# Patient Record
Sex: MALE | Race: WHITE | NOT HISPANIC OR LATINO | Employment: FULL TIME | URBAN - METROPOLITAN AREA
[De-identification: names, ages, dates, MRNs, and addresses within clinical notes are randomized per-mention and may not be internally consistent; named-entity substitution may affect disease eponyms.]

---

## 2023-03-09 ENCOUNTER — OFFICE VISIT (OUTPATIENT)
Dept: PRIMARY CARE | Facility: CLINIC | Age: 43
End: 2023-03-09
Payer: COMMERCIAL

## 2023-03-09 VITALS
DIASTOLIC BLOOD PRESSURE: 126 MMHG | BODY MASS INDEX: 28.14 KG/M2 | HEIGHT: 69 IN | WEIGHT: 190 LBS | SYSTOLIC BLOOD PRESSURE: 179 MMHG

## 2023-03-09 DIAGNOSIS — Z91.89 HIGH RISK FOR COLON CANCER: ICD-10-CM

## 2023-03-09 DIAGNOSIS — F41.1 ANXIETY, GENERALIZED: Primary | ICD-10-CM

## 2023-03-09 DIAGNOSIS — K21.9 GASTROESOPHAGEAL REFLUX DISEASE WITHOUT ESOPHAGITIS: ICD-10-CM

## 2023-03-09 DIAGNOSIS — G47.33 OBSTRUCTIVE SLEEP APNEA: ICD-10-CM

## 2023-03-09 DIAGNOSIS — I10 PRIMARY HYPERTENSION: ICD-10-CM

## 2023-03-09 DIAGNOSIS — Z30.09 ENCOUNTER FOR VASECTOMY COUNSELING: ICD-10-CM

## 2023-03-09 DIAGNOSIS — M54.2 NECK PAIN: ICD-10-CM

## 2023-03-09 DIAGNOSIS — Z00.00 HEALTHCARE MAINTENANCE: ICD-10-CM

## 2023-03-09 DIAGNOSIS — F33.41 RECURRENT MAJOR DEPRESSIVE DISORDER, IN PARTIAL REMISSION (CMS-HCC): ICD-10-CM

## 2023-03-09 DIAGNOSIS — E50.9 VITAMIN A DEFICIENCY, UNSPECIFIED: ICD-10-CM

## 2023-03-09 DIAGNOSIS — L72.0 INCLUSION CYST OF SKIN OF SHOULDER: ICD-10-CM

## 2023-03-09 LAB
ERYTHROCYTE DISTRIBUTION WIDTH (RATIO) BY AUTOMATED COUNT: 13.5 % (ref 11.5–14.5)
ERYTHROCYTE MEAN CORPUSCULAR HEMOGLOBIN CONCENTRATION (G/DL) BY AUTOMATED: 33.3 G/DL (ref 32–36)
ERYTHROCYTE MEAN CORPUSCULAR VOLUME (FL) BY AUTOMATED COUNT: 92 FL (ref 80–100)
ERYTHROCYTES (10*6/UL) IN BLOOD BY AUTOMATED COUNT: 4.96 X10E12/L (ref 4.5–5.9)
HEMATOCRIT (%) IN BLOOD BY AUTOMATED COUNT: 45.6 % (ref 41–52)
HEMOGLOBIN (G/DL) IN BLOOD: 15.2 G/DL (ref 13.5–17.5)
LEUKOCYTES (10*3/UL) IN BLOOD BY AUTOMATED COUNT: 7 X10E9/L (ref 4.4–11.3)
NRBC (PER 100 WBCS) BY AUTOMATED COUNT: 0 /100 WBC (ref 0–0)
PLATELETS (10*3/UL) IN BLOOD AUTOMATED COUNT: 365 X10E9/L (ref 150–450)

## 2023-03-09 PROCEDURE — 85027 COMPLETE CBC AUTOMATED: CPT

## 2023-03-09 PROCEDURE — 84443 ASSAY THYROID STIM HORMONE: CPT

## 2023-03-09 PROCEDURE — 99386 PREV VISIT NEW AGE 40-64: CPT | Performed by: FAMILY MEDICINE

## 2023-03-09 PROCEDURE — 3080F DIAST BP >= 90 MM HG: CPT | Performed by: FAMILY MEDICINE

## 2023-03-09 PROCEDURE — 4004F PT TOBACCO SCREEN RCVD TLK: CPT | Performed by: FAMILY MEDICINE

## 2023-03-09 PROCEDURE — 36415 COLL VENOUS BLD VENIPUNCTURE: CPT | Performed by: FAMILY MEDICINE

## 2023-03-09 PROCEDURE — 82607 VITAMIN B-12: CPT

## 2023-03-09 PROCEDURE — 80061 LIPID PANEL: CPT

## 2023-03-09 PROCEDURE — 82306 VITAMIN D 25 HYDROXY: CPT

## 2023-03-09 PROCEDURE — 3077F SYST BP >= 140 MM HG: CPT | Performed by: FAMILY MEDICINE

## 2023-03-09 PROCEDURE — 80053 COMPREHEN METABOLIC PANEL: CPT

## 2023-03-09 RX ORDER — ESCITALOPRAM OXALATE 10 MG/1
10 TABLET ORAL DAILY
COMMUNITY
Start: 2022-10-20 | End: 2023-03-12 | Stop reason: SDUPTHER

## 2023-03-09 RX ORDER — SULFAMETHOXAZOLE AND TRIMETHOPRIM 800; 160 MG/1; MG/1
1 TABLET ORAL 2 TIMES DAILY
Qty: 14 TABLET | Refills: 0 | Status: SHIPPED | OUTPATIENT
Start: 2023-03-09 | End: 2023-03-16

## 2023-03-09 RX ORDER — ACETAMINOPHEN 500 MG
TABLET ORAL
Qty: 1 EACH | Refills: 0 | Status: SHIPPED | OUTPATIENT
Start: 2023-03-09

## 2023-03-09 RX ORDER — AMLODIPINE AND BENAZEPRIL HYDROCHLORIDE 5; 10 MG/1; MG/1
1 CAPSULE ORAL DAILY
Qty: 30 CAPSULE | Refills: 11 | Status: SHIPPED | OUTPATIENT
Start: 2023-03-09 | End: 2023-04-10 | Stop reason: ALTCHOICE

## 2023-03-09 RX ORDER — PANTOPRAZOLE SODIUM 40 MG/1
40 TABLET, DELAYED RELEASE ORAL DAILY
Qty: 30 TABLET | Refills: 1 | Status: SHIPPED | OUTPATIENT
Start: 2023-03-09 | End: 2023-03-31

## 2023-03-09 RX ORDER — BUSPIRONE HYDROCHLORIDE 5 MG/1
5 TABLET ORAL 2 TIMES DAILY
COMMUNITY
Start: 2022-10-20 | End: 2023-03-12 | Stop reason: SDUPTHER

## 2023-03-09 ASSESSMENT — PAIN SCALES - GENERAL: PAINLEVEL: 0-NO PAIN

## 2023-03-10 ENCOUNTER — TELEPHONE (OUTPATIENT)
Dept: PRIMARY CARE | Facility: CLINIC | Age: 43
End: 2023-03-10
Payer: COMMERCIAL

## 2023-03-10 LAB
ALANINE AMINOTRANSFERASE (SGPT) (U/L) IN SER/PLAS: 13 U/L (ref 10–52)
ALBUMIN (G/DL) IN SER/PLAS: 4.6 G/DL (ref 3.4–5)
ALKALINE PHOSPHATASE (U/L) IN SER/PLAS: 61 U/L (ref 33–120)
ANION GAP IN SER/PLAS: 14 MMOL/L (ref 10–20)
ASPARTATE AMINOTRANSFERASE (SGOT) (U/L) IN SER/PLAS: 14 U/L (ref 9–39)
BILIRUBIN TOTAL (MG/DL) IN SER/PLAS: 0.5 MG/DL (ref 0–1.2)
CALCIDIOL (25 OH VITAMIN D3) (NG/ML) IN SER/PLAS: 24 NG/ML
CALCIUM (MG/DL) IN SER/PLAS: 9.7 MG/DL (ref 8.6–10.6)
CARBON DIOXIDE, TOTAL (MMOL/L) IN SER/PLAS: 24 MMOL/L (ref 21–32)
CHLORIDE (MMOL/L) IN SER/PLAS: 106 MMOL/L (ref 98–107)
CHOLESTEROL (MG/DL) IN SER/PLAS: 207 MG/DL (ref 0–199)
CHOLESTEROL IN HDL (MG/DL) IN SER/PLAS: 53 MG/DL
CHOLESTEROL/HDL RATIO: 3.9
COBALAMIN (VITAMIN B12) (PG/ML) IN SER/PLAS: 344 PG/ML (ref 211–911)
CREATININE (MG/DL) IN SER/PLAS: 0.89 MG/DL (ref 0.5–1.3)
GFR MALE: >90 ML/MIN/1.73M2
GLUCOSE (MG/DL) IN SER/PLAS: 88 MG/DL (ref 74–99)
LDL: 130 MG/DL (ref 0–99)
POTASSIUM (MMOL/L) IN SER/PLAS: 3.9 MMOL/L (ref 3.5–5.3)
PROTEIN TOTAL: 6.8 G/DL (ref 6.4–8.2)
SODIUM (MMOL/L) IN SER/PLAS: 140 MMOL/L (ref 136–145)
THYROTROPIN (MIU/L) IN SER/PLAS BY DETECTION LIMIT <= 0.05 MIU/L: 1.13 MIU/L (ref 0.44–3.98)
TRIGLYCERIDE (MG/DL) IN SER/PLAS: 120 MG/DL (ref 0–149)
UREA NITROGEN (MG/DL) IN SER/PLAS: 14 MG/DL (ref 6–23)
VLDL: 24 MG/DL (ref 0–40)

## 2023-03-12 RX ORDER — BUSPIRONE HYDROCHLORIDE 5 MG/1
5 TABLET ORAL 2 TIMES DAILY
Qty: 60 TABLET | Refills: 2 | Status: SHIPPED | OUTPATIENT
Start: 2023-03-12 | End: 2023-04-05

## 2023-03-12 RX ORDER — ESCITALOPRAM OXALATE 10 MG/1
10 TABLET ORAL DAILY
Qty: 30 TABLET | Refills: 2 | Status: SHIPPED | OUTPATIENT
Start: 2023-03-12 | End: 2023-04-05

## 2023-03-13 NOTE — PROGRESS NOTES
"Reason for Visit: Annual Physical Exam    HPI:Patient here to [facility].   Snores very loudly   Has been depressed wOULD LIKE TO TAKE MEDS REGULARLY  Bp EVERY HIGH  NECK PAIN SEEVRE IN am  INFECTED CYST painful       Active Problem List  Patient Active Problem List   Diagnosis    Recurrent major depressive disorder, in partial remission (CMS/HCC)    Anxiety, generalized    Obstructive sleep apnea    Neck pain    Primary hypertension    High risk for colon cancer    Vitamin a deficiency, unspecified    Healthcare maintenance       Comprehensive Medical/Surgical/Social/Family History  Past Medical History:   Diagnosis Date    Anxiety     Depression     Hypertension     Sleep apnea      Past Surgical History:   Procedure Laterality Date    CYST REMOVAL       Social History     Social History Narrative    Not on file         Allergies and Medications  Patient has no known allergies.  Current Outpatient Medications on File Prior to Visit   Medication Sig Dispense Refill    busPIRone (Buspar) 5 mg tablet Take 1 tablet (5 mg) by mouth in the morning and 1 tablet (5 mg) before bedtime.      escitalopram (Lexapro) 10 mg tablet Take 1 tablet (10 mg) by mouth once daily.       No current facility-administered medications on file prior to visit.         ROS otherwise negative aside from what was mentioned above in HPI.    Vitals  BP (!) 179/126   Ht 1.753 m (5' 9\")   Wt 86.2 kg (190 lb)   BMI 28.06 kg/m²   Body mass index is 28.06 kg/m².  Physical Exam  Gen: Alert, NAD  HEENT:  PERRLA, EOMI, conjunctiva and sclera normal in appearance. External auditory canals/TMs normal; Oral cavity and posterior pharynx without lesions/exudate  Neck:  Supple with FROM; No masses/nodes palpable; Thyroid nontender and without nodules; No JUDITH  Respiratory:  Lungs CTAB  Cardiovascular:  Heart RRR. No M/R/G. Peripheral pulses equal bilaterally  Abdomen:  Soft, nontender, BS present throughout; No R/G/R; No HSM or masses palpated  Extremities:  " FROM all extremities; Muscle strength grossly normal with good tone  Neuro:  CN II-XII intact; Reflexes 2+/2+; Gross motor and sensory intact  Skin:  No suspicious lesions present  Breast: No masses, skin lesions or nipple discharges, no axillary lymphadenopathy    Assessment and Plan:  Problem List Items Addressed This Visit          Nervous    Obstructive sleep apnea    Relevant Orders    Home sleep test    Neck pain    Relevant Orders    XR cervical spine 2-3 views       Circulatory    Primary hypertension    Relevant Medications    blood pressure test kit-large kit    amLODIPine-benazepriL (Lotrel) 5-10 mg capsule    Other Relevant Orders    CT cardiac scoring wo IV contrast    Referral to General Surgery       Endocrine/Metabolic    Vitamin a deficiency, unspecified    Relevant Orders    Vitamin D, Total (Completed)    Vitamin B12 (Completed)       Other    Recurrent major depressive disorder, in partial remission (CMS/HCC)    Anxiety, generalized - Primary    High risk for colon cancer    Relevant Orders    Colonoscopy    Healthcare maintenance    Relevant Orders    Comprehensive Metabolic Panel (Completed)    CBC (Completed)    Lipid Panel (Completed)    Thyroid Stimulating Hormone (Completed)     Other Visit Diagnoses       Encounter for vasectomy counseling        Relevant Orders    Referral to Urology    Gastroesophageal reflux disease without esophagitis        Relevant Medications    pantoprazole (ProtoNix) 40 mg EC tablet    Inclusion cyst of skin of shoulder        Relevant Medications    sulfamethoxazole-trimethoprim (Bactrim DS) 800-160 mg tablet

## 2023-03-16 ENCOUNTER — APPOINTMENT (OUTPATIENT)
Dept: PRIMARY CARE | Facility: CLINIC | Age: 43
End: 2023-03-16
Payer: COMMERCIAL

## 2023-03-31 DIAGNOSIS — K21.9 GASTROESOPHAGEAL REFLUX DISEASE WITHOUT ESOPHAGITIS: ICD-10-CM

## 2023-03-31 RX ORDER — PANTOPRAZOLE SODIUM 40 MG/1
40 TABLET, DELAYED RELEASE ORAL DAILY
Qty: 90 TABLET | Refills: 1 | Status: SHIPPED | OUTPATIENT
Start: 2023-03-31 | End: 2023-09-18 | Stop reason: SDUPTHER

## 2023-04-05 DIAGNOSIS — F41.1 ANXIETY, GENERALIZED: ICD-10-CM

## 2023-04-05 RX ORDER — ESCITALOPRAM OXALATE 10 MG/1
10 TABLET ORAL DAILY
Qty: 90 TABLET | Refills: 1 | Status: SHIPPED | OUTPATIENT
Start: 2023-04-05 | End: 2023-04-10 | Stop reason: SDUPTHER

## 2023-04-05 RX ORDER — BUSPIRONE HYDROCHLORIDE 5 MG/1
5 TABLET ORAL 2 TIMES DAILY
Qty: 60 TABLET | Refills: 2 | Status: SHIPPED | OUTPATIENT
Start: 2023-04-05 | End: 2023-04-10 | Stop reason: SDUPTHER

## 2023-04-10 ENCOUNTER — OFFICE VISIT (OUTPATIENT)
Dept: PRIMARY CARE | Facility: CLINIC | Age: 43
End: 2023-04-10
Payer: COMMERCIAL

## 2023-04-10 VITALS
HEART RATE: 91 BPM | WEIGHT: 188 LBS | BODY MASS INDEX: 27.85 KG/M2 | HEIGHT: 69 IN | DIASTOLIC BLOOD PRESSURE: 100 MMHG | SYSTOLIC BLOOD PRESSURE: 138 MMHG

## 2023-04-10 DIAGNOSIS — M54.2 NECK PAIN: ICD-10-CM

## 2023-04-10 DIAGNOSIS — I10 PRIMARY HYPERTENSION: Primary | ICD-10-CM

## 2023-04-10 DIAGNOSIS — Z91.89 HIGH RISK FOR COLON CANCER: ICD-10-CM

## 2023-04-10 DIAGNOSIS — G47.33 OBSTRUCTIVE SLEEP APNEA: ICD-10-CM

## 2023-04-10 DIAGNOSIS — F41.1 ANXIETY, GENERALIZED: ICD-10-CM

## 2023-04-10 DIAGNOSIS — F33.41 RECURRENT MAJOR DEPRESSIVE DISORDER, IN PARTIAL REMISSION (CMS-HCC): ICD-10-CM

## 2023-04-10 PROBLEM — F33.9 RECURRENT MAJOR DEPRESSIVE EPISODES (CMS-HCC): Status: RESOLVED | Noted: 2023-04-10 | Resolved: 2023-04-10

## 2023-04-10 PROBLEM — F17.200 TOBACCO DEPENDENCE SYNDROME: Status: RESOLVED | Noted: 2023-04-10 | Resolved: 2023-04-10

## 2023-04-10 PROBLEM — G44.019 EPISODIC CLUSTER HEADACHE: Status: RESOLVED | Noted: 2023-04-10 | Resolved: 2023-04-10

## 2023-04-10 PROBLEM — B35.3 TINEA PEDIS OF BOTH FEET: Status: RESOLVED | Noted: 2019-06-17 | Resolved: 2023-04-10

## 2023-04-10 PROCEDURE — 3075F SYST BP GE 130 - 139MM HG: CPT | Performed by: FAMILY MEDICINE

## 2023-04-10 PROCEDURE — 99214 OFFICE O/P EST MOD 30 MIN: CPT | Performed by: FAMILY MEDICINE

## 2023-04-10 PROCEDURE — 4004F PT TOBACCO SCREEN RCVD TLK: CPT | Performed by: FAMILY MEDICINE

## 2023-04-10 PROCEDURE — 3080F DIAST BP >= 90 MM HG: CPT | Performed by: FAMILY MEDICINE

## 2023-04-10 RX ORDER — AMLODIPINE AND BENAZEPRIL HYDROCHLORIDE 5; 20 MG/1; MG/1
1 CAPSULE ORAL DAILY
Qty: 90 CAPSULE | Refills: 1 | Status: SHIPPED | OUTPATIENT
Start: 2023-04-10 | End: 2023-06-12 | Stop reason: ALTCHOICE

## 2023-04-10 RX ORDER — ESCITALOPRAM OXALATE 10 MG/1
10 TABLET ORAL DAILY
Qty: 90 TABLET | Refills: 0 | Status: SHIPPED | OUTPATIENT
Start: 2023-04-10 | End: 2023-06-12 | Stop reason: SDUPTHER

## 2023-04-10 RX ORDER — BUSPIRONE HYDROCHLORIDE 5 MG/1
5 TABLET ORAL 2 TIMES DAILY
Qty: 180 TABLET | Refills: 0 | Status: SHIPPED | OUTPATIENT
Start: 2023-04-10 | End: 2024-01-18

## 2023-04-15 ASSESSMENT — ENCOUNTER SYMPTOMS
FEVER: 0
GASTROINTESTINAL NEGATIVE: 1
NEUROLOGICAL NEGATIVE: 1
ENDOCRINE NEGATIVE: 1
HEMATOLOGIC/LYMPHATIC NEGATIVE: 1
PSYCHIATRIC NEGATIVE: 1
NAUSEA: 0
VOMITING: 0
MUSCULOSKELETAL NEGATIVE: 1
ALLERGIC/IMMUNOLOGIC NEGATIVE: 1
CHILLS: 0
CARDIOVASCULAR NEGATIVE: 1
RESPIRATORY NEGATIVE: 1

## 2023-04-15 NOTE — PROGRESS NOTES
"Subjective   Patient ID: Francisco J Walsh is a 42 y.o. male who presents for Follow-up (Sleep studdy colon shoulder all scheduled ).      HPI patient presents for follow-up blood pressure remains high waiting to get colonoscopy done neck pain has improved since blood pressure has improved anxiety depression responding to current treatment he is scheduled for vasectomy  Current Outpatient Medications on File Prior to Visit   Medication Sig Dispense Refill    blood pressure test kit-large kit Check BP daily 1 each 0    pantoprazole (ProtoNix) 40 mg EC tablet Take 1 tablet (40 mg) by mouth once daily. 90 tablet 1    [DISCONTINUED] amLODIPine-benazepriL (Lotrel) 5-10 mg capsule Take 1 capsule by mouth once daily. 30 capsule 11    [DISCONTINUED] busPIRone (Buspar) 5 mg tablet Take 1 tablet (5 mg) by mouth in the morning and 1 tablet (5 mg) before bedtime. 60 tablet 2    [DISCONTINUED] escitalopram (Lexapro) 10 mg tablet Take 1 tablet (10 mg) by mouth once daily. 90 tablet 1     No current facility-administered medications on file prior to visit.        Review of Systems   Constitutional:  Negative for chills and fever.   HENT: Negative.     Respiratory: Negative.     Cardiovascular: Negative.    Gastrointestinal: Negative.  Negative for nausea and vomiting.   Endocrine: Negative.    Genitourinary: Negative.    Musculoskeletal: Negative.    Skin: Negative.  Negative for rash.   Allergic/Immunologic: Negative.    Neurological: Negative.    Hematological: Negative.    Psychiatric/Behavioral: Negative.     All other systems reviewed and are negative.      Objective   Blood Pressure (Abnormal) 138/100   Pulse 91   Height 1.753 m (5' 9\")   Weight 85.3 kg (188 lb)   Body Mass Index 27.76 kg/m²   BSA: 2.04 meters squared  Growth percentiles: Facility age limit for growth %chandu is 20 years. Facility age limit for growth %chandu is 20 years.   No visits with results within 1 Week(s) from this visit.   Latest known visit with " results is:   Office Visit on 03/09/2023   Component Date Value Ref Range Status    Glucose 03/09/2023 88  74 - 99 mg/dL Final    Sodium 03/09/2023 140  136 - 145 mmol/L Final    Potassium 03/09/2023 3.9  3.5 - 5.3 mmol/L Final    Chloride 03/09/2023 106  98 - 107 mmol/L Final    Bicarbonate 03/09/2023 24  21 - 32 mmol/L Final    Anion Gap 03/09/2023 14  10 - 20 mmol/L Final    Urea Nitrogen 03/09/2023 14  6 - 23 mg/dL Final    Creatinine 03/09/2023 0.89  0.50 - 1.30 mg/dL Final    GFR MALE 03/09/2023 >90  >90 mL/min/1.73m2 Final     CALCULATIONS OF ESTIMATED GFR ARE PERFORMED   USING THE 2021 CKD-EPI STUDY REFIT EQUATION   WITHOUT THE RACE VARIABLE FOR THE IDMS-TRACEABLE   CREATININE METHODS.    https://jasn.asnjournals.org/content/early/2021/09/22/ASN.3166313080    Calcium 03/09/2023 9.7  8.6 - 10.6 mg/dL Final    Albumin 03/09/2023 4.6  3.4 - 5.0 g/dL Final    Alkaline Phosphatase 03/09/2023 61  33 - 120 U/L Final    Total Protein 03/09/2023 6.8  6.4 - 8.2 g/dL Final    AST 03/09/2023 14  9 - 39 U/L Final    Total Bilirubin 03/09/2023 0.5  0.0 - 1.2 mg/dL Final    ALT (SGPT) 03/09/2023 13  10 - 52 U/L Final     Patients treated with Sulfasalazine may generate    falsely decreased results for ALT.    WBC 03/09/2023 7.0  4.4 - 11.3 x10E9/L Final    nRBC 03/09/2023 0.0  0.0 - 0.0 /100 WBC Final    RBC 03/09/2023 4.96  4.50 - 5.90 x10E12/L Final    Hemoglobin 03/09/2023 15.2  13.5 - 17.5 g/dL Final    Hematocrit 03/09/2023 45.6  41.0 - 52.0 % Final    MCV 03/09/2023 92  80 - 100 fL Final    MCHC 03/09/2023 33.3  32.0 - 36.0 g/dL Final    Platelets 03/09/2023 365  150 - 450 x10E9/L Final    RDW 03/09/2023 13.5  11.5 - 14.5 % Final    Cholesterol 03/09/2023 207 (H)  0 - 199 mg/dL Final    .      AGE      DESIRABLE   BORDERLINE HIGH   HIGH     0-19 Y     0 - 169       170 - 199     >/= 200    20-24 Y     0 - 189       190 - 224     >/= 225         >24 Y     0 - 199       200 - 239     >/= 240   **All ranges are based on  fasting samples. Specific   therapeutic targets will vary based on patient-specific   cardiac risk.  .   Pediatric guidelines reference:Pediatrics 2011, 128(S5).   Adult guidelines reference: NCEP ATPIII Guidelines,     SHANTELL 2001, 258:2486-97  .   Venipuncture immediately after or during the    administration of Metamizole may lead to falsely   low results. Testing should be performed immediately   prior to Metamizole dosing.    HDL 03/09/2023 53.0  mg/dL Final    .      AGE      VERY LOW   LOW     NORMAL    HIGH       0-19 Y       < 35   < 40     40-45     ----    20-24 Y       ----   < 40       >45     ----      >24 Y       ----   < 40     40-60      >60  .    Cholesterol/HDL Ratio 03/09/2023 3.9   Final    REF VALUES  DESIRABLE  < 3.4  HIGH RISK  > 5.0    LDL 03/09/2023 130 (H)  0 - 99 mg/dL Final    .                           NEAR      BORD      AGE      DESIRABLE  OPTIMAL    HIGH     HIGH     VERY HIGH     0-19 Y     0 - 109     ---    110-129   >/= 130     ----    20-24 Y     0 - 119     ---    120-159   >/= 160     ----      >24 Y     0 -  99   100-129  130-159   160-189     >/=190  .    VLDL 03/09/2023 24  0 - 40 mg/dL Final    Triglycerides 03/09/2023 120  0 - 149 mg/dL Final    .      AGE      DESIRABLE   BORDERLINE HIGH   HIGH     VERY HIGH   0 D-90 D    19 - 174         ----         ----        ----  91 D- 9 Y     0 -  74        75 -  99     >/= 100      ----    10-19 Y     0 -  89        90 - 129     >/= 130      ----    20-24 Y     0 - 114       115 - 149     >/= 150      ----         >24 Y     0 - 149       150 - 199    200- 499    >/= 500  .   Venipuncture immediately after or during the    administration of Metamizole may lead to falsely   low results. Testing should be performed immediately   prior to Metamizole dosing.    TSH 03/09/2023 1.13  0.44 - 3.98 mIU/L Final     TSH testing is performed using different testing    methodology at [facility]3] than at other    [facility].  Direct result comparisons should    only be made within the same method.    Vitamin D, 25-Hydroxy 03/09/2023 24 (A)  ng/mL Final    .  DEFICIENCY:         < 20   NG/ML  INSUFFICIENCY:      20-29  NG/ML  SUFFICIENCY:         NG/ML    THIS ASSAY ACCURATELY QUANTIFIES THE SUM OF  VITAMIN D3, 25-HYDROXY AND VIT D2,25-HYDROXY.    Vitamin B-12 03/09/2023 344  211 - 911 pg/mL Final      Physical Exam  Constitutional:       General: He is not in acute distress.  Eyes:      Extraocular Movements: Extraocular movements intact.   Cardiovascular:      Rate and Rhythm: Normal rate and regular rhythm.   Pulmonary:      Breath sounds: Normal breath sounds.   Abdominal:      General: Bowel sounds are normal.   Musculoskeletal:         General: Normal range of motion.      Cervical back: No rigidity.   Skin:     Findings: No rash.   Neurological:      General: No focal deficit present.      Mental Status: He is alert.   Psychiatric:         Thought Content: Thought content normal.         Assessment/Plan   Problem List Items Addressed This Visit          Nervous    Obstructive sleep apnea    Neck pain       Circulatory    Primary hypertension - Primary    Relevant Medications    amLODIPine-benazepriL (Lotrel) 5-20 mg capsule       Other    Recurrent major depressive disorder, in partial remission (CMS/HCC)    Anxiety, generalized    Relevant Medications    escitalopram (Lexapro) 10 mg tablet    busPIRone (Buspar) 5 mg tablet    High risk for colon cancer     Hypertension we will increase amlodipine benazepril to 5/20 mg  Continue current medications Lexapro and BuSpar  Neck pain improved does not need an MRI  Sleep apnea has to do a sleep study  Follow-up in 2 months

## 2023-04-27 DIAGNOSIS — G47.33 OBSTRUCTIVE SLEEP APNEA: ICD-10-CM

## 2023-05-03 ENCOUNTER — TELEPHONE (OUTPATIENT)
Dept: PRIMARY CARE | Facility: CLINIC | Age: 43
End: 2023-05-03
Payer: COMMERCIAL

## 2023-05-03 DIAGNOSIS — G47.30 SEVERE SLEEP APNEA: ICD-10-CM

## 2023-05-03 NOTE — TELEPHONE ENCOUNTER
----- Message from Rodriguez Minor MD sent at 5/3/2023  1:15 PM EDT -----  Please call the patient regarding his abnormal result.  Severe sleep apnea. Recommendation is for patient to get inlab sleep study for CPAP titration and referal to sleep medicine.

## 2023-06-12 ENCOUNTER — OFFICE VISIT (OUTPATIENT)
Dept: PRIMARY CARE | Facility: CLINIC | Age: 43
End: 2023-06-12
Payer: COMMERCIAL

## 2023-06-12 VITALS
BODY MASS INDEX: 27.55 KG/M2 | DIASTOLIC BLOOD PRESSURE: 96 MMHG | WEIGHT: 186 LBS | HEIGHT: 69 IN | SYSTOLIC BLOOD PRESSURE: 153 MMHG

## 2023-06-12 DIAGNOSIS — F33.41 RECURRENT MAJOR DEPRESSIVE DISORDER, IN PARTIAL REMISSION (CMS-HCC): ICD-10-CM

## 2023-06-12 DIAGNOSIS — G47.33 OBSTRUCTIVE SLEEP APNEA: ICD-10-CM

## 2023-06-12 DIAGNOSIS — I10 PRIMARY HYPERTENSION: Primary | ICD-10-CM

## 2023-06-12 DIAGNOSIS — F41.1 ANXIETY, GENERALIZED: ICD-10-CM

## 2023-06-12 PROCEDURE — 99214 OFFICE O/P EST MOD 30 MIN: CPT | Performed by: FAMILY MEDICINE

## 2023-06-12 PROCEDURE — 3080F DIAST BP >= 90 MM HG: CPT | Performed by: FAMILY MEDICINE

## 2023-06-12 PROCEDURE — 4004F PT TOBACCO SCREEN RCVD TLK: CPT | Performed by: FAMILY MEDICINE

## 2023-06-12 PROCEDURE — 3077F SYST BP >= 140 MM HG: CPT | Performed by: FAMILY MEDICINE

## 2023-06-12 RX ORDER — AMLODIPINE AND BENAZEPRIL HYDROCHLORIDE 5; 20 MG/1; MG/1
1 CAPSULE ORAL DAILY
Qty: 90 CAPSULE | Refills: 1 | Status: CANCELLED | OUTPATIENT
Start: 2023-06-12 | End: 2024-06-11

## 2023-06-12 RX ORDER — ESCITALOPRAM OXALATE 10 MG/1
10 TABLET ORAL DAILY
Qty: 90 TABLET | Refills: 1 | Status: SHIPPED | OUTPATIENT
Start: 2023-06-12 | End: 2023-12-03

## 2023-06-12 RX ORDER — AMLODIPINE AND BENAZEPRIL HYDROCHLORIDE 5; 40 MG/1; MG/1
1 CAPSULE ORAL DAILY
Qty: 90 CAPSULE | Refills: 1 | Status: SHIPPED | OUTPATIENT
Start: 2023-06-12 | End: 2023-12-03

## 2023-06-12 NOTE — PROGRESS NOTES
"Assessment and Plan:  Problem List Items Addressed This Visit       Recurrent major depressive disorder, in partial remission (CMS/HCC)    Current Assessment & Plan     Symptoms improved continue meds         Anxiety, generalized    Relevant Medications    escitalopram (Lexapro) 10 mg tablet    Obstructive sleep apnea - Primary    Current Assessment & Plan     Scheduled in lab sleep study         Primary hypertension    Current Assessment & Plan     Increase dose of benazepril         Relevant Medications    amLODIPine-benazepriL (LotreL) 5-40 mg capsule     Follow-up in 3 months or sooner if      HPI: Here for follow-up blood pressure elevated has to repair sleep apnea taking meds as prescribed monitoring blood pressure at home mood is stable much better  Sleep studuy scheduled      ROS   Constitutional:  o weight loss. Negative for chills and fever.   HENT: Negative.     Respiratory: Negative.     Cardiovascular: Negative.    Gastrointestinal: Negative.  Negative for nausea and vomiting.   Endocrine: Negative.    Genitourinary: Negative.    Musculoskeletal: Negative.    Skin: Negative.  Negative for rash.   Allergic/Immunologic: Negative.    Neurological: Negative.    Hematological: Negative.    Psychiatric/Behavioral anxiety depressionAll other systems reviewed and are negative.      Blood Pressure (Abnormal) 153/96   Height 1.753 m (5' 9\")   Weight 84.4 kg (186 lb)   Body Mass Index 27.47 kg/m²   Body mass index is 27.47 kg/m².  Physical Exam    ENT:      Head: Normocephalic and atraumatic.      Right Ear: Tympanic membrane normal.      Left Ear: Tympanic membrane normal.      Nose: Nose normal.      Mouth/Throat:      Mouth: Mucous membranes are moist.   Eyes:      Pupils: Pupils are equal, round, and reactive to light.   Cardiovascular:      Rate and Rhythm: Normal rate and regular rhythm.      Pulses: Normal pulses.      Heart sounds: Normal heart sounds.   Pulmonary:      Effort: Pulmonary effort is " normal.      Breath sounds: Normal breath sounds.   Abdominal:      General: Abdomen is flat. Bowel sounds are normal.      Palpations: Abdomen is soft.   Musculoskeletal:         General: Normal range of motion.      Cervical back: Normal range of motion and neck supple.   Skin:     General: Skin is warm and dry.      Capillary Refill: Capillary refill takes less than 2 seconds.   Neurological:      General: No focal deficit present.      Mental Status: She is alert and oriented to person, place, and time.   Psychiatric:         Mood and Affect: Mood normal.       Active Problem List  Patient Active Problem List   Diagnosis    Recurrent major depressive disorder, in partial remission (CMS/HCC)    Anxiety, generalized    Obstructive sleep apnea    Neck pain    Primary hypertension    High risk for colon cancer    Vitamin a deficiency, unspecified    Healthcare maintenance       Comprehensive Medical/Surgical/Social/Family History  Past Medical History:   Diagnosis Date    Anxiety     Anxiety state 04/10/2023    Depression     Episodic cluster headache 04/10/2023    Hypertension     Recurrent major depressive episodes (CMS/HCC) 04/10/2023    Sleep apnea     Tinea pedis of both feet 06/17/2019    Tobacco dependence syndrome 04/10/2023     Past Surgical History:   Procedure Laterality Date    CYST REMOVAL      VASECTOMY       Social History     Social History Narrative    Not on file       Allergies and Medications  Bupropion hcl  Current Outpatient Medications   Medication Instructions    amLODIPine-benazepriL (LotreL) 5-40 mg capsule 1 capsule, oral, Daily    blood pressure test kit-large kit Check BP daily    busPIRone (BUSPAR) 5 mg, oral, 2 times daily    escitalopram (LEXAPRO) 10 mg, oral, Daily    pantoprazole (PROTONIX) 40 mg, oral, Daily

## 2023-06-18 ASSESSMENT — PATIENT HEALTH QUESTIONNAIRE - PHQ9
2. FEELING DOWN, DEPRESSED OR HOPELESS: SEVERAL DAYS
SUM OF ALL RESPONSES TO PHQ9 QUESTIONS 1 AND 2: 2
1. LITTLE INTEREST OR PLEASURE IN DOING THINGS: SEVERAL DAYS

## 2023-09-10 RX ORDER — POLYETHYLENE GLYCOL 3350, SODIUM CHLORIDE, SODIUM BICARBONATE, POTASSIUM CHLORIDE 420; 11.2; 5.72; 1.48 G/4L; G/4L; G/4L; G/4L
4000 POWDER, FOR SOLUTION ORAL ONCE
COMMUNITY

## 2023-09-18 ENCOUNTER — OFFICE VISIT (OUTPATIENT)
Dept: PRIMARY CARE | Facility: CLINIC | Age: 43
End: 2023-09-18
Payer: COMMERCIAL

## 2023-09-18 VITALS
SYSTOLIC BLOOD PRESSURE: 123 MMHG | DIASTOLIC BLOOD PRESSURE: 86 MMHG | WEIGHT: 185 LBS | BODY MASS INDEX: 27.4 KG/M2 | HEART RATE: 87 BPM | HEIGHT: 69 IN

## 2023-09-18 DIAGNOSIS — Z23 NEED FOR IMMUNIZATION AGAINST INFLUENZA: ICD-10-CM

## 2023-09-18 DIAGNOSIS — I10 PRIMARY HYPERTENSION: Primary | ICD-10-CM

## 2023-09-18 DIAGNOSIS — F41.1 ANXIETY, GENERALIZED: ICD-10-CM

## 2023-09-18 DIAGNOSIS — K21.9 GASTROESOPHAGEAL REFLUX DISEASE WITHOUT ESOPHAGITIS: ICD-10-CM

## 2023-09-18 DIAGNOSIS — F33.41 RECURRENT MAJOR DEPRESSIVE DISORDER, IN PARTIAL REMISSION (CMS-HCC): ICD-10-CM

## 2023-09-18 DIAGNOSIS — G47.33 OBSTRUCTIVE SLEEP APNEA: ICD-10-CM

## 2023-09-18 PROCEDURE — 3074F SYST BP LT 130 MM HG: CPT | Performed by: FAMILY MEDICINE

## 2023-09-18 PROCEDURE — 3079F DIAST BP 80-89 MM HG: CPT | Performed by: FAMILY MEDICINE

## 2023-09-18 PROCEDURE — 90686 IIV4 VACC NO PRSV 0.5 ML IM: CPT | Performed by: FAMILY MEDICINE

## 2023-09-18 PROCEDURE — 90471 IMMUNIZATION ADMIN: CPT | Performed by: FAMILY MEDICINE

## 2023-09-18 PROCEDURE — 4004F PT TOBACCO SCREEN RCVD TLK: CPT | Performed by: FAMILY MEDICINE

## 2023-09-18 PROCEDURE — 99214 OFFICE O/P EST MOD 30 MIN: CPT | Performed by: FAMILY MEDICINE

## 2023-09-18 RX ORDER — PANTOPRAZOLE SODIUM 40 MG/1
40 TABLET, DELAYED RELEASE ORAL DAILY
Qty: 90 TABLET | Refills: 1 | Status: SHIPPED | OUTPATIENT
Start: 2023-09-18 | End: 2024-01-18 | Stop reason: SDUPTHER

## 2023-09-24 PROBLEM — K21.9 GASTROESOPHAGEAL REFLUX DISEASE WITHOUT ESOPHAGITIS: Status: ACTIVE | Noted: 2023-09-24

## 2023-09-24 PROBLEM — Z23 NEED FOR IMMUNIZATION AGAINST INFLUENZA: Status: ACTIVE | Noted: 2023-09-24

## 2023-09-24 NOTE — ASSESSMENT & PLAN NOTE
Has been compliant with CPAP using it 8 hours a night and feels much better it is medically necessary for patient to have CPAP due to severe sleep apnea

## 2023-09-24 NOTE — PROGRESS NOTES
"Assessment and Plan:  Problem List Items Addressed This Visit       Recurrent major depressive disorder, in partial remission (CMS/[facility])    Current Assessment & Plan     Symptoms improved continue meds         Anxiety, generalized    Obstructive sleep apnea    Current Assessment & Plan     Has been compliant with CPAP using it 8 hours a night and feels much better it is medically necessary for patient to have CPAP due to severe sleep apnea         Primary hypertension - Primary    Current Assessment & Plan     Increase dose of benazepril         Gastroesophageal reflux disease without esophagitis    Relevant Medications    pantoprazole (ProtoNix) 40 mg EC tablet    Need for immunization against influenza    Relevant Orders    Flu vaccine (IIV4) age 6 months and greater, preservative free (Completed)     Follow-up in 3 months or sooner if      HPI: Here for follow-up blood pressure elevated has to repair sleep apnea taking meds as prescribed monitoring blood pressure at home mood is stable much better  Sleep studuy scheduled      ROS   Constitutional:  o weight loss. Negative for chills and fever.   HENT: Negative.     Respiratory: Negative.     Cardiovascular: Negative.    Gastrointestinal: Negative.  Negative for nausea and vomiting.   Endocrine: Negative.    Genitourinary: Negative.    Musculoskeletal: Negative.    Skin: Negative.  Negative for rash.   Allergic/Immunologic: Negative.    Neurological: Negative.    Hematological: Negative.    Psychiatric/Behavioral anxiety depressionAll other systems reviewed and are negative.      Blood Pressure 123/86   Pulse 87   Height 1.753 m (5' 9\")   Weight 83.9 kg (185 lb)   Body Mass Index 27.32 kg/m²   Body mass index is 27.32 kg/m².  Physical Exam    ENT:      Head: Normocephalic and atraumatic.      Right Ear: Tympanic membrane normal.      Left Ear: Tympanic membrane normal.      Nose: Nose normal.      Mouth/Throat:      Mouth: Mucous membranes are moist.   Eyes:    "   Pupils: Pupils are equal, round, and reactive to light.   Cardiovascular:      Rate and Rhythm: Normal rate and regular rhythm.      Pulses: Normal pulses.      Heart sounds: Normal heart sounds.   Pulmonary:      Effort: Pulmonary effort is normal.      Breath sounds: Normal breath sounds.   Abdominal:      General: Abdomen is flat. Bowel sounds are normal.      Palpations: Abdomen is soft.   Musculoskeletal:         General: Normal range of motion.      Cervical back: Normal range of motion and neck supple.   Skin:     General: Skin is warm and dry.      Capillary Refill: Capillary refill takes less than 2 seconds.   Neurological:      General: No focal deficit present.      Mental Status: She is alert and oriented to person, place, and time.   Psychiatric:         Mood and Affect: Mood normal.       Active Problem List  Patient Active Problem List   Diagnosis    Recurrent major depressive disorder, in partial remission (CMS/HCC)    Anxiety, generalized    Obstructive sleep apnea    Neck pain    Primary hypertension    High risk for colon cancer    Vitamin a deficiency, unspecified    Healthcare maintenance    Gastroesophageal reflux disease without esophagitis    Need for immunization against influenza       Comprehensive Medical/Surgical/Social/Family History  Past Medical History:   Diagnosis Date    Anxiety     Anxiety state 04/10/2023    Depression     Episodic cluster headache 04/10/2023    Hypertension     Recurrent major depressive episodes (CMS/HCC) 04/10/2023    Sleep apnea     Tinea pedis of both feet 06/17/2019    Tobacco dependence syndrome 04/10/2023     Past Surgical History:   Procedure Laterality Date    CYST REMOVAL      VASECTOMY       Social History     Social History Narrative    Not on file       Allergies and Medications  Bupropion hcl  Current Outpatient Medications   Medication Instructions    amLODIPine-benazepriL (LotreL) 5-40 mg capsule 1 capsule, oral, Daily    blood pressure test  kit-large kit Check BP daily    busPIRone (BUSPAR) 5 mg, oral, 2 times daily    escitalopram (LEXAPRO) 10 mg, oral, Daily    pantoprazole (PROTONIX) 40 mg, oral, Daily    polyethylene glycol-electrolytes (Nulytely) 420 gram solution 4,000 mL, oral, Once, Take as instructed by  endoscopy instructions

## 2023-10-13 ENCOUNTER — APPOINTMENT (OUTPATIENT)
Dept: GASTROENTEROLOGY | Facility: HOSPITAL | Age: 43
End: 2023-10-13
Payer: COMMERCIAL

## 2023-10-13 DIAGNOSIS — Z12.11 ENCOUNTER FOR SCREENING FOR MALIGNANT NEOPLASM OF COLON: ICD-10-CM

## 2023-12-01 ENCOUNTER — APPOINTMENT (OUTPATIENT)
Dept: RADIOLOGY | Facility: CLINIC | Age: 43
End: 2023-12-01
Payer: COMMERCIAL

## 2023-12-03 DIAGNOSIS — I10 PRIMARY HYPERTENSION: ICD-10-CM

## 2023-12-03 DIAGNOSIS — F41.1 ANXIETY, GENERALIZED: ICD-10-CM

## 2023-12-03 RX ORDER — AMLODIPINE AND BENAZEPRIL HYDROCHLORIDE 5; 40 MG/1; MG/1
1 CAPSULE ORAL DAILY
Qty: 90 CAPSULE | Refills: 1 | Status: SHIPPED | OUTPATIENT
Start: 2023-12-03 | End: 2024-04-06

## 2023-12-03 RX ORDER — ESCITALOPRAM OXALATE 10 MG/1
10 TABLET ORAL DAILY
Qty: 90 TABLET | Refills: 1 | Status: SHIPPED | OUTPATIENT
Start: 2023-12-03 | End: 2024-01-18 | Stop reason: ALTCHOICE

## 2024-01-18 ENCOUNTER — OFFICE VISIT (OUTPATIENT)
Dept: PRIMARY CARE | Facility: CLINIC | Age: 44
End: 2024-01-18
Payer: COMMERCIAL

## 2024-01-18 VITALS
BODY MASS INDEX: 28.14 KG/M2 | WEIGHT: 190 LBS | SYSTOLIC BLOOD PRESSURE: 138 MMHG | HEIGHT: 69 IN | HEART RATE: 102 BPM | DIASTOLIC BLOOD PRESSURE: 91 MMHG

## 2024-01-18 DIAGNOSIS — Z00.00 ROUTINE GENERAL MEDICAL EXAMINATION AT A HEALTH CARE FACILITY: ICD-10-CM

## 2024-01-18 DIAGNOSIS — F41.1 ANXIETY, GENERALIZED: Primary | ICD-10-CM

## 2024-01-18 DIAGNOSIS — I10 PRIMARY HYPERTENSION: ICD-10-CM

## 2024-01-18 DIAGNOSIS — Z12.11 COLON CANCER SCREENING: ICD-10-CM

## 2024-01-18 DIAGNOSIS — K21.9 GASTROESOPHAGEAL REFLUX DISEASE WITHOUT ESOPHAGITIS: ICD-10-CM

## 2024-01-18 LAB
ALBUMIN SERPL BCP-MCNC: 4.7 G/DL (ref 3.4–5)
ALP SERPL-CCNC: 69 U/L (ref 33–120)
ALT SERPL W P-5'-P-CCNC: 13 U/L (ref 10–52)
ANION GAP SERPL CALC-SCNC: 15 MMOL/L (ref 10–20)
AST SERPL W P-5'-P-CCNC: 12 U/L (ref 9–39)
BILIRUB SERPL-MCNC: 0.5 MG/DL (ref 0–1.2)
BUN SERPL-MCNC: 16 MG/DL (ref 6–23)
CALCIUM SERPL-MCNC: 9.4 MG/DL (ref 8.6–10.6)
CHLORIDE SERPL-SCNC: 107 MMOL/L (ref 98–107)
CHOLEST SERPL-MCNC: 188 MG/DL (ref 0–199)
CHOLESTEROL/HDL RATIO: 3.8
CO2 SERPL-SCNC: 22 MMOL/L (ref 21–32)
CREAT SERPL-MCNC: 0.96 MG/DL (ref 0.5–1.3)
EGFRCR SERPLBLD CKD-EPI 2021: >90 ML/MIN/1.73M*2
GLUCOSE SERPL-MCNC: 101 MG/DL (ref 74–99)
HDLC SERPL-MCNC: 49.8 MG/DL
LDLC SERPL CALC-MCNC: 107 MG/DL
NON HDL CHOLESTEROL: 138 MG/DL (ref 0–149)
POTASSIUM SERPL-SCNC: 4 MMOL/L (ref 3.5–5.3)
PROT SERPL-MCNC: 7.2 G/DL (ref 6.4–8.2)
SODIUM SERPL-SCNC: 140 MMOL/L (ref 136–145)
TRIGL SERPL-MCNC: 155 MG/DL (ref 0–149)
VLDL: 31 MG/DL (ref 0–40)

## 2024-01-18 PROCEDURE — 3080F DIAST BP >= 90 MM HG: CPT | Performed by: FAMILY MEDICINE

## 2024-01-18 PROCEDURE — 80061 LIPID PANEL: CPT

## 2024-01-18 PROCEDURE — 3075F SYST BP GE 130 - 139MM HG: CPT | Performed by: FAMILY MEDICINE

## 2024-01-18 PROCEDURE — 99214 OFFICE O/P EST MOD 30 MIN: CPT | Performed by: FAMILY MEDICINE

## 2024-01-18 PROCEDURE — 80053 COMPREHEN METABOLIC PANEL: CPT

## 2024-01-18 PROCEDURE — 36415 COLL VENOUS BLD VENIPUNCTURE: CPT

## 2024-01-18 RX ORDER — BUSPIRONE HYDROCHLORIDE 10 MG/1
10 TABLET ORAL 2 TIMES DAILY
Qty: 180 TABLET | Refills: 1 | Status: SHIPPED | OUTPATIENT
Start: 2024-01-18 | End: 2025-01-17

## 2024-01-18 RX ORDER — PANTOPRAZOLE SODIUM 40 MG/1
40 TABLET, DELAYED RELEASE ORAL DAILY
Qty: 90 TABLET | Refills: 1 | Status: SHIPPED | OUTPATIENT
Start: 2024-01-18

## 2024-01-18 RX ORDER — ESCITALOPRAM OXALATE 20 MG/1
20 TABLET ORAL DAILY
Qty: 90 TABLET | Refills: 1 | Status: SHIPPED | OUTPATIENT
Start: 2024-01-18 | End: 2024-07-16

## 2024-01-18 ASSESSMENT — ENCOUNTER SYMPTOMS
DEPRESSION: 0
CHILLS: 0
OCCASIONAL FEELINGS OF UNSTEADINESS: 0
MUSCULOSKELETAL NEGATIVE: 1
HEMATOLOGIC/LYMPHATIC NEGATIVE: 1
ENDOCRINE NEGATIVE: 1
CARDIOVASCULAR NEGATIVE: 1
ALLERGIC/IMMUNOLOGIC NEGATIVE: 1
NAUSEA: 0
VOMITING: 0
NEUROLOGICAL NEGATIVE: 1
RESPIRATORY NEGATIVE: 1
GASTROINTESTINAL NEGATIVE: 1
FEVER: 0
LOSS OF SENSATION IN FEET: 0
PSYCHIATRIC NEGATIVE: 1

## 2024-01-18 ASSESSMENT — PATIENT HEALTH QUESTIONNAIRE - PHQ9
SUM OF ALL RESPONSES TO PHQ9 QUESTIONS 1 AND 2: 0
1. LITTLE INTEREST OR PLEASURE IN DOING THINGS: NOT AT ALL
2. FEELING DOWN, DEPRESSED OR HOPELESS: NOT AT ALL

## 2024-01-18 NOTE — PROGRESS NOTES
"Chief Complaint/HPI:  Subjective   Patient ID: Francisco J Walsh is a 43 y.o. male who presents for Follow-up.      HPI patient presents for follow-up blood pressure remains high waiting to get colonoscopy done neck pain has improved since blood pressure has improved anxiety depression not responding to current treatment   Thinks doses of meds need to be increased   Current Outpatient Medications on File Prior to Visit   Medication Sig Dispense Refill    amLODIPine-benazepriL (Lotrel) 5-40 mg capsule TAKE 1 CAPSULE BY MOUTH ONCE DAILY 90 capsule 1    blood pressure test kit-large kit Check BP daily 1 each 0    polyethylene glycol-electrolytes (Nulytely) 420 gram solution Take 4,000 mL by mouth 1 time. Take as instructed by  endoscopy instructions      [DISCONTINUED] busPIRone (Buspar) 5 mg tablet Take 1 tablet (5 mg) by mouth in the morning and 1 tablet (5 mg) before bedtime. 180 tablet 0    [DISCONTINUED] escitalopram (Lexapro) 10 mg tablet Take 1 tablet (10 mg) by mouth once daily. 90 tablet 1    [DISCONTINUED] pantoprazole (ProtoNix) 40 mg EC tablet Take 1 tablet (40 mg) by mouth once daily. 90 tablet 1     No current facility-administered medications on file prior to visit.        Review of Systems   Constitutional:  Negative for chills and fever.   HENT: Negative.     Respiratory: Negative.     Cardiovascular: Negative.    Gastrointestinal: Negative.  Negative for nausea and vomiting.   Endocrine: Negative.    Genitourinary: Negative.    Musculoskeletal: Negative.    Skin: Negative.  Negative for rash.   Allergic/Immunologic: Negative.    Neurological: Negative.    Hematological: Negative.    Psychiatric/Behavioral: Negative.     All other systems reviewed and are negative.      Objective   Blood Pressure (Abnormal) 138/91   Pulse 102   Height 1.753 m (5' 9\")   Weight 86.2 kg (190 lb)   Body Mass Index 28.06 kg/m²   BSA: 2.05 meters squared  Growth percentiles: Facility age limit for growth %chandu is 20 years. " Facility age limit for growth %chandu is 20 years.   No visits with results within 1 Week(s) from this visit.   Latest known visit with results is:   Legacy Encounter on 04/11/2023   Component Date Value Ref Range Status    Glucose 04/11/2023 86  65 - 99 MG/DL Final    Urea Nitrogen 04/11/2023 13  8 - 25 MG/DL Final    Creatinine 04/11/2023 1.0  0.4 - 1.6 MG/DL Final    Urea Nitrogen/Creatinine (g/g) Uri* 04/11/2023 13.0  8 - 21 RATIO Final    Sodium 04/11/2023 142  133 - 145 MMOL/L Final    Potassium 04/11/2023 3.9  3.4 - 5.1 MMOL/L Final    Chloride 04/11/2023 106  97 - 107 MMOL/L Final    Bicarbonate 04/11/2023 25  24 - 31 MMOL/L Final    Anion Gap 04/11/2023 11  0 - 19 MMOL/L Final    Calcium 04/11/2023 9.0  8.5 - 10.4 MG/DL Final    ESTIMATED GFR 04/11/2023 96  mL/min/1.73 m2 Final    Comment: CALCULATIONS OF ESTIMATED GFR ARE PERFORMED USING THE 2021 CKD-EPI   STUDY REFIT EQUATION WITHOUT THE RACE VARIABLE FOR THE IDMS-TRACEABLE   CREATININE METHODS.  [email or web address]://jasn.asnjournals.org/content/early/2021/09/[email or web address]  Performed at [address] [address]      Hemoglobin 04/11/2023 15.5  13.5 - 16.5 GM/DL Final    Hematocrit 04/11/2023 44.9  41 - 50 % Final    Performed at [address] [address]      Physical Exam  Constitutional:       General: He is not in acute distress.  Eyes:      Extraocular Movements: Extraocular movements intact.   Cardiovascular:      Rate and Rhythm: Normal rate and regular rhythm.   Pulmonary:      Breath sounds: Normal breath sounds.   Abdominal:      General: Bowel sounds are normal.   Musculoskeletal:         General: Normal range of motion.      Cervical back: No rigidity.   Skin:     Findings: No rash.   Neurological:      General: No focal deficit present.      Mental Status: He is alert.   Psychiatric:         Thought Content: Thought content normal.         Assessment/Plan   Problem List Items Addressed This Visit       Anxiety,  generalized - Primary    Relevant Medications    escitalopram (Lexapro) 20 mg tablet    busPIRone (Buspar) 10 mg tablet    Primary hypertension    Gastroesophageal reflux disease without esophagitis    Relevant Medications    pantoprazole (ProtoNix) 40 mg EC tablet    Colon cancer screening    Relevant Orders    Colonoscopy Screening; Average Risk Patient    Routine general medical examination at a health care facility    Relevant Orders    Comprehensive Metabolic Panel    Lipid Panel     Hypertension we will c/w  amlodipine benazepril to 5/20 mg  Increase doses of Lexapro and BuSpar  Neck pain improved does not need an MRI  Sleep apnea  CPAP has been complaint CPAP very helpful. Using 8 hours per night .   Follow-up in 2 months           ROS otherwise negative aside from what was mentioned above in HPI.      Patient Active Problem List   Diagnosis    Recurrent major depressive disorder, in partial remission (CMS/HCC)    Anxiety, generalized    Obstructive sleep apnea    Neck pain    Primary hypertension    High risk for colon cancer    Vitamin a deficiency, unspecified    Healthcare maintenance    Gastroesophageal reflux disease without esophagitis    Need for immunization against influenza    Colon cancer screening    Routine general medical examination at a health care facility     Past Medical History:   Diagnosis Date    Anxiety     Anxiety state 04/10/2023    Depression     Episodic cluster headache 04/10/2023    Hypertension     Recurrent major depressive episodes (CMS/HCC) 04/10/2023    Sleep apnea     Tinea pedis of both feet 06/17/2019    Tobacco dependence syndrome 04/10/2023     Past Surgical History:   Procedure Laterality Date    CYST REMOVAL      VASECTOMY       Family History   Problem Relation Name Age of Onset    Hypertension Mother      Heart disease Father      Hypertension Father      Colon cancer Other aunt     Pancreatic cancer Other uncle      Social History     Tobacco Use    Smoking status:  Every Day     Packs/day: 1.00     Years: 27.00     Additional pack years: 0.00     Total pack years: 27.00     Types: Cigarettes    Smokeless tobacco: Never   Substance Use Topics    Alcohol use: Yes    Drug use: Never         ALLERGIES  Allergies   Allergen Reactions    Bupropion Hcl Unknown         MEDICATIONS  Current Outpatient Medications   Medication Sig Dispense Refill    amLODIPine-benazepriL (Lotrel) 5-40 mg capsule TAKE 1 CAPSULE BY MOUTH ONCE DAILY 90 capsule 1    blood pressure test kit-large kit Check BP daily 1 each 0    polyethylene glycol-electrolytes (Nulytely) 420 gram solution Take 4,000 mL by mouth 1 time. Take as instructed by  endoscopy instructions      busPIRone (Buspar) 10 mg tablet Take 1 tablet (10 mg) by mouth 2 times a day. 180 tablet 1    escitalopram (Lexapro) 20 mg tablet Take 1 tablet (20 mg) by mouth once daily. 90 tablet 1    pantoprazole (ProtoNix) 40 mg EC tablet Take 1 tablet (40 mg) by mouth once daily. 90 tablet 1     No current facility-administered medications for this visit.         PHYSICAL EXAM  Visit Vitals  Blood Pressure (Abnormal) 138/91   Pulse 102     .FLOWAMB[11   .FLOWAMB[14   Body mass index is 28.06 kg/m².  Gen: Alert, NAD  HEENT:  PERRLA, EOMI, conjunctiva and sclera normal in appearance  Respiratory:  Lungs CTAB  Cardiovascular:  Heart RRR. No M/R/G  Neuro:  Gross motor and sensory intact  Skin:  No suspicious lesions present    ASSESSMENT/PLAN  Problem List Items Addressed This Visit       Anxiety, generalized - Primary    Relevant Medications    escitalopram (Lexapro) 20 mg tablet    busPIRone (Buspar) 10 mg tablet    Primary hypertension    Gastroesophageal reflux disease without esophagitis    Relevant Medications    pantoprazole (ProtoNix) 40 mg EC tablet    Colon cancer screening    Relevant Orders    Colonoscopy Screening; Average Risk Patient    Routine general medical examination at a health care facility    Relevant Orders    Comprehensive  Metabolic Panel    Lipid Panel           Rodriguez Minor MD

## 2024-02-28 DIAGNOSIS — F41.1 ANXIETY, GENERALIZED: ICD-10-CM

## 2024-02-29 RX ORDER — ESCITALOPRAM OXALATE 10 MG/1
10 TABLET ORAL DAILY
Qty: 90 TABLET | Refills: 1 | OUTPATIENT
Start: 2024-02-29

## 2024-04-06 DIAGNOSIS — I10 PRIMARY HYPERTENSION: ICD-10-CM

## 2024-04-06 RX ORDER — AMLODIPINE AND BENAZEPRIL HYDROCHLORIDE 5; 40 MG/1; MG/1
1 CAPSULE ORAL DAILY
Qty: 90 CAPSULE | Refills: 0 | Status: SHIPPED | OUTPATIENT
Start: 2024-04-06

## 2024-07-17 DIAGNOSIS — F41.1 ANXIETY, GENERALIZED: ICD-10-CM

## 2024-07-22 ENCOUNTER — APPOINTMENT (OUTPATIENT)
Dept: PRIMARY CARE | Facility: CLINIC | Age: 44
End: 2024-07-22
Payer: COMMERCIAL

## 2024-07-22 DIAGNOSIS — K21.9 GASTROESOPHAGEAL REFLUX DISEASE WITHOUT ESOPHAGITIS: ICD-10-CM

## 2024-07-22 DIAGNOSIS — F41.1 ANXIETY, GENERALIZED: Primary | ICD-10-CM

## 2024-07-22 DIAGNOSIS — I10 PRIMARY HYPERTENSION: ICD-10-CM

## 2024-07-22 DIAGNOSIS — Z00.00 ROUTINE GENERAL MEDICAL EXAMINATION AT A HEALTH CARE FACILITY: ICD-10-CM

## 2024-07-22 PROCEDURE — 3075F SYST BP GE 130 - 139MM HG: CPT | Performed by: FAMILY MEDICINE

## 2024-07-22 PROCEDURE — 3079F DIAST BP 80-89 MM HG: CPT | Performed by: FAMILY MEDICINE

## 2024-07-22 PROCEDURE — 99396 PREV VISIT EST AGE 40-64: CPT | Performed by: FAMILY MEDICINE

## 2024-07-22 RX ORDER — ESCITALOPRAM OXALATE 10 MG/1
10 TABLET ORAL DAILY
Qty: 30 TABLET | Refills: 0 | Status: SHIPPED | OUTPATIENT
Start: 2024-07-22 | End: 2025-01-18

## 2024-07-22 RX ORDER — PANTOPRAZOLE SODIUM 40 MG/1
40 TABLET, DELAYED RELEASE ORAL DAILY
Qty: 90 TABLET | Refills: 1 | Status: SHIPPED | OUTPATIENT
Start: 2024-07-22

## 2024-07-22 RX ORDER — AMLODIPINE AND BENAZEPRIL HYDROCHLORIDE 5; 40 MG/1; MG/1
1 CAPSULE ORAL DAILY
Qty: 90 CAPSULE | Refills: 0 | Status: SHIPPED | OUTPATIENT
Start: 2024-07-22

## 2024-07-22 RX ORDER — ESCITALOPRAM OXALATE 20 MG/1
20 TABLET ORAL DAILY
Qty: 90 TABLET | Refills: 1 | OUTPATIENT
Start: 2024-07-22

## 2024-07-22 RX ORDER — BUSPIRONE HYDROCHLORIDE 5 MG/1
5 TABLET ORAL 2 TIMES DAILY
Qty: 180 TABLET | Refills: 1 | Status: SHIPPED | OUTPATIENT
Start: 2024-07-22 | End: 2025-07-22

## 2024-07-22 ASSESSMENT — ENCOUNTER SYMPTOMS
LOSS OF SENSATION IN FEET: 0
DEPRESSION: 0
OCCASIONAL FEELINGS OF UNSTEADINESS: 0

## 2024-07-22 NOTE — PROGRESS NOTES
Subjective   Patient ID: Francisco J Walsh is a 43 y.o. male who presents for Anxiety (Pt would like to possibly lower meds he is on buspar and lexapro ).    Last Physical : _1___ Years ago     Pt's PMH, PSH, SH, FH , meds and allergies was obtained / reviewed and updated .     Dental  Visits : Y  Vision issues : N  Hearing issues : N    Immunizations : Y    Diet :  Could be better   Exercise:  Not regularly  Weight concerns : None    Alcohol: as noted in SH  Tobacco: as noted in SH  Recreational drugs :  None /as noted in SH     Sexually active : Active   Contraception :   Erectile dysfunction :    Colorectal cancer screening   Prostate cancer screening     Metabolic screening   - Lipids   - Glucose   Patient states anxiety is much better would like to try lower dose anxiety medication has also been very tired  ==================================    Visit Vitals  /88   Pulse 88   Wt 88 kg (194 lb)   SpO2 95%   BMI 28.65 kg/m²   Smoking Status Every Day   BSA 2.07 m²      =====================  Review of Systems:    Constitutional: no chills, no fever and no night sweats.     Eyes: no blurred vision and no eyesight problems.     ENT: no hearing loss, no nasal congestion, no nasal discharge, no hoarseness and no sore throat.     Cardiovascular: no chest pain, no intermittent leg claudication, no lower extremity edema, no palpitations and no syncope.     Respiratory: no cough, no shortness of breath during exertion, no shortness of breath at rest and no wheezing.     Gastrointestinal: no abdominal pain, no constipation, no blood in stools, no diarrhea, no melena, no nausea, no rectal pain and no vomiting.     Genitourinary: no dysuria, no change in urinary frequency, no urinary hesitancy and no feelings of urinary urgency.     Musculoskeletal: no arthralgias, no back pain and no myalgias.     Integumentary: no new skin lesions and no rashes.     Neurological: no difficulty walking, no headache, no limb weakness, no  numbness and no tingling.     Psychiatric: no anxiety, no depression, no anhedonia and no substance use disorders.     Endocrine: no recent weight gain and no recent weight loss.     Hematologic/Lymphatic: no tendency for easy bruising and no swollen glands.          All other systems have been reviewed and are negative for complaint.    =====================================================    Physical exam :    Constitutional: Alert and in no acute distress. Well developed, well nourished.     Eyes: Normal external exam. Pupils were equal in size, round, reactive to light (PERRL) with normal accommodation and extraocular movements intact (EOMI).     Ears, Nose, Mouth, and Throat: External inspection of ears and nose: Normal.  Otoscopic examination: Normal.      Neck: No neck mass was observed. Supple.     Cardiovascular: Heart rate and rhythm were normal, normal S1 and S2, no gallops, no murmurs and no pericardial rub    Pulmonary: No respiratory distress. Clear bilateral breath sounds.     Abdomen: Soft nontender; no abdominal mass palpated. No organomegaly.     Musculoskeletal: No joint swelling seen, normal movements of all extremities. Range of motion: Normal.  Muscle strength/tone: Normal.      Skin: Normal skin color and pigmentation, normal skin turgor, and no rash.     Neurologic: Deep tendon reflexes were 2+ and symmetric. Sensation: Normal.     Psychiatric: Judgment and insight: Intact. Mood and affect: Normal.    Lymphatic : Cervical/ axillary/ groin Lns Palpable/ non palpable       Assessment/Plan    Problem List Items Addressed This Visit             ICD-10-CM    Anxiety, generalized - Primary F41.1     Patient will message back in few weeks regarding fatigue if it is improved with=decreasing dose of Lexapro may need to change medication         Relevant Medications    escitalopram (Lexapro) 10 mg tablet    busPIRone (Buspar) 5 mg tablet    Primary hypertension I10    Relevant Medications     amLODIPine-benazepriL (Lotrel) 5-40 mg capsule    Gastroesophageal reflux disease without esophagitis K21.9    Relevant Medications    pantoprazole (ProtoNix) 40 mg EC tablet    Routine general medical examination at a health care facility Z00.00     -Nutrition: Stressed importance of moderation in sodium/caffeine intake, saturated fat and cholesterol, caloric balance, sufficient intake of fresh fruits, vegetables, fiber, ---Exercise: Stressed the importance of regular exercise.   --Injury prevention: Discussed safety belts, safety helmets, smoke detector, smoking near bedding or upholstery.   --Dental health: Discussed importance of regular tooth brushing, flossing, and dental visits.  --Immunizations reviewed.  --Discussed benefits of screening colonoscopy.  --After hours service discussed with patient

## 2024-07-28 VITALS
SYSTOLIC BLOOD PRESSURE: 134 MMHG | OXYGEN SATURATION: 95 % | WEIGHT: 194 LBS | DIASTOLIC BLOOD PRESSURE: 88 MMHG | BODY MASS INDEX: 28.65 KG/M2 | HEART RATE: 88 BPM

## 2024-07-28 NOTE — ASSESSMENT & PLAN NOTE
Patient will message back in few weeks regarding fatigue if it is improved with=decreasing dose of Lexapro may need to change medication

## 2024-08-15 DIAGNOSIS — F41.1 ANXIETY, GENERALIZED: Primary | ICD-10-CM

## 2024-08-15 DIAGNOSIS — F41.1 ANXIETY, GENERALIZED: ICD-10-CM

## 2024-08-15 RX ORDER — ESCITALOPRAM OXALATE 10 MG/1
10 TABLET ORAL DAILY
Qty: 90 TABLET | Refills: 0 | Status: SHIPPED | OUTPATIENT
Start: 2024-08-15

## 2024-08-15 RX ORDER — DULOXETIN HYDROCHLORIDE 30 MG/1
30 CAPSULE, DELAYED RELEASE ORAL DAILY
Qty: 30 CAPSULE | Refills: 2 | Status: SHIPPED | OUTPATIENT
Start: 2024-08-15 | End: 2024-11-13

## 2024-09-18 DIAGNOSIS — F41.1 ANXIETY, GENERALIZED: ICD-10-CM

## 2024-09-27 RX ORDER — DULOXETIN HYDROCHLORIDE 30 MG/1
30 CAPSULE, DELAYED RELEASE ORAL DAILY
Qty: 90 CAPSULE | Refills: 0 | Status: SHIPPED | OUTPATIENT
Start: 2024-09-27 | End: 2025-03-26

## 2024-11-25 DIAGNOSIS — F41.1 ANXIETY, GENERALIZED: ICD-10-CM

## 2024-11-25 RX ORDER — ESCITALOPRAM OXALATE 10 MG/1
10 TABLET ORAL DAILY
Qty: 90 TABLET | Refills: 0 | Status: SHIPPED | OUTPATIENT
Start: 2024-11-25

## 2024-12-07 DIAGNOSIS — I10 PRIMARY HYPERTENSION: ICD-10-CM

## 2024-12-09 RX ORDER — AMLODIPINE AND BENAZEPRIL HYDROCHLORIDE 5; 40 MG/1; MG/1
1 CAPSULE ORAL DAILY
Qty: 90 CAPSULE | Refills: 0 | Status: SHIPPED | OUTPATIENT
Start: 2024-12-09

## 2024-12-21 DIAGNOSIS — F41.1 ANXIETY, GENERALIZED: ICD-10-CM

## 2024-12-22 RX ORDER — DULOXETIN HYDROCHLORIDE 30 MG/1
30 CAPSULE, DELAYED RELEASE ORAL DAILY
Qty: 90 CAPSULE | Refills: 0 | Status: SHIPPED | OUTPATIENT
Start: 2024-12-22 | End: 2025-06-20

## 2025-01-10 ENCOUNTER — OFFICE VISIT (OUTPATIENT)
Dept: PRIMARY CARE | Facility: CLINIC | Age: 45
End: 2025-01-10
Payer: COMMERCIAL

## 2025-01-10 VITALS
HEART RATE: 103 BPM | HEIGHT: 69 IN | DIASTOLIC BLOOD PRESSURE: 110 MMHG | BODY MASS INDEX: 30.07 KG/M2 | SYSTOLIC BLOOD PRESSURE: 148 MMHG | WEIGHT: 203 LBS

## 2025-01-10 DIAGNOSIS — I10 PRIMARY HYPERTENSION: ICD-10-CM

## 2025-01-10 DIAGNOSIS — F33.41 RECURRENT MAJOR DEPRESSIVE DISORDER, IN PARTIAL REMISSION (CMS-HCC): Primary | ICD-10-CM

## 2025-01-10 DIAGNOSIS — Z23 NEEDS FLU SHOT: ICD-10-CM

## 2025-01-10 PROCEDURE — 3008F BODY MASS INDEX DOCD: CPT | Performed by: FAMILY MEDICINE

## 2025-01-10 PROCEDURE — 3080F DIAST BP >= 90 MM HG: CPT | Performed by: FAMILY MEDICINE

## 2025-01-10 PROCEDURE — 99214 OFFICE O/P EST MOD 30 MIN: CPT | Performed by: FAMILY MEDICINE

## 2025-01-10 PROCEDURE — 3077F SYST BP >= 140 MM HG: CPT | Performed by: FAMILY MEDICINE

## 2025-01-10 PROCEDURE — 90656 IIV3 VACC NO PRSV 0.5 ML IM: CPT | Performed by: FAMILY MEDICINE

## 2025-01-10 PROCEDURE — 90471 IMMUNIZATION ADMIN: CPT | Performed by: FAMILY MEDICINE

## 2025-01-10 RX ORDER — ESCITALOPRAM OXALATE 20 MG/1
20 TABLET ORAL DAILY
Qty: 90 TABLET | Refills: 1 | Status: SHIPPED | OUTPATIENT
Start: 2025-01-10 | End: 2025-07-09

## 2025-01-10 ASSESSMENT — PATIENT HEALTH QUESTIONNAIRE - PHQ9
2. FEELING DOWN, DEPRESSED OR HOPELESS: NOT AT ALL
SUM OF ALL RESPONSES TO PHQ9 QUESTIONS 1 AND 2: 0
1. LITTLE INTEREST OR PLEASURE IN DOING THINGS: NOT AT ALL

## 2025-01-10 ASSESSMENT — ENCOUNTER SYMPTOMS
OCCASIONAL FEELINGS OF UNSTEADINESS: 0
LOSS OF SENSATION IN FEET: 0
DEPRESSION: 0

## 2025-01-10 NOTE — PROGRESS NOTES
"Chief Complaint/HPI:  Subjective   Patient ID: Francisco J Walsh is a 44 y.o. male who presents for Follow-up.      HPI patient presents for follow-up blood pressure controlled.   Depression worse. Cymbalta did not work. Thinks doses of meds need to be increased   Current Outpatient Medications on File Prior to Visit   Medication Sig Dispense Refill    amLODIPine-benazepriL (Lotrel) 5-40 mg capsule TAKE 1 CAPSULE BY MOUTH EVERY DAY 90 capsule 0    blood pressure test kit-large kit Check BP daily 1 each 0    busPIRone (Buspar) 5 mg tablet Take 1 tablet (5 mg) by mouth 2 times a day. 180 tablet 1    pantoprazole (ProtoNix) 40 mg EC tablet Take 1 tablet (40 mg) by mouth once daily. 90 tablet 1    [DISCONTINUED] DULoxetine (Cymbalta) 30 mg DR capsule TAKE 1 CAPSULE (30 MG) BY MOUTH ONCE DAILY. DO NOT CRUSH OR CHEW. 90 capsule 0    [DISCONTINUED] escitalopram (Lexapro) 10 mg tablet Take 1 tablet (10 mg) by mouth once daily. 90 tablet 0     No current facility-administered medications on file prior to visit.        Review of Systems   Constitutional:  Negative for chills and fever.   HENT: Negative.     Respiratory: Negative.     Cardiovascular: Negative.    Gastrointestinal: Negative.  Negative for nausea and vomiting.   Endocrine: Negative.    Genitourinary: Negative.    Musculoskeletal: Negative.    Skin: Negative.  Negative for rash.   Allergic/Immunologic: Negative.    Neurological: Negative.    Hematological: Negative.    Psychiatric/Behavioral: Negative.     All other systems reviewed and are negative.      Objective   BP (!) 148/110   Pulse 103   Ht 1.753 m (5' 9\")   Wt 92.1 kg (203 lb)   BMI 29.98 kg/m²   BSA: 2.12 meters squared  Growth percentiles: Facility age limit for growth %chandu is 20 years. Facility age limit for growth %chandu is 20 years.   No visits with results within 1 Week(s) from this visit.   Latest known visit with results is:   Office Visit on 01/18/2024   Component Date Value Ref Range Status    " Glucose 01/18/2024 101 (H)  74 - 99 mg/dL Final    Sodium 01/18/2024 140  136 - 145 mmol/L Final    Potassium 01/18/2024 4.0  3.5 - 5.3 mmol/L Final    Chloride 01/18/2024 107  98 - 107 mmol/L Final    Bicarbonate 01/18/2024 22  21 - 32 mmol/L Final    Anion Gap 01/18/2024 15  10 - 20 mmol/L Final    Urea Nitrogen 01/18/2024 16  6 - 23 mg/dL Final    Creatinine 01/18/2024 0.96  0.50 - 1.30 mg/dL Final    eGFR 01/18/2024 >90  >60 mL/min/1.73m*2 Final    Calculations of estimated GFR are performed using the 2021 CKD-EPI Study Refit equation without the race variable for the IDMS-Traceable creatinine methods.  https://jasn.asnjournals.org/content/early/2021/09/22/ASN.[email or web address]    Calcium 01/18/2024 9.4  8.6 - 10.6 mg/dL Final    Albumin 01/18/2024 4.7  3.4 - 5.0 g/dL Final    Alkaline Phosphatase 01/18/2024 69  33 - 120 U/L Final    Total Protein 01/18/2024 7.2  6.4 - 8.2 g/dL Final    AST 01/18/2024 12  9 - 39 U/L Final    Bilirubin, Total 01/18/2024 0.5  0.0 - 1.2 mg/dL Final    ALT 01/18/2024 13  10 - 52 U/L Final    Patients treated with Sulfasalazine may generate falsely decreased results for ALT.    Cholesterol 01/18/2024 188  0 - 199 mg/dL Final          Age      Desirable   Borderline High   High     0-19 Y     0 - 169       170 - 199     >/= 200    20-24 Y     0 - 189       190 - 224     >/= 225         >24 Y     0 - 199       200 - 239     >/= 240   **All ranges are based on fasting samples. Specific   therapeutic targets will vary based on patient-specific   cardiac risk.    Pediatric guidelines reference:Pediatrics 2011, 128(S5).Adult guidelines reference: NCEP ATPIII Guidelines,SHANTELL 2001, 258:2486-97    Venipuncture immediately after or during the administration of Metamizole may lead to falsely low results. Testing should be performed immediately prior to Metamizole dosing.    HDL-Cholesterol 01/18/2024 49.8  mg/dL Final      Age       Very Low   Low     Normal    High    0-19 Y    < 35      < 40      40-45     ----  20-24 Y    ----     < 40      >45      ----        >24 Y      ----     < 40     40-60      >60      Cholesterol/HDL Ratio 01/18/2024 3.8   Final      Ref Values  Desirable  < 3.4  High Risk  > 5.0    LDL Calculated 01/18/2024 107 (H)  <=99 mg/dL Final                                Near   Borderline      AGE      Desirable  Optimal    High     High     Very High     0-19 Y     0 - 109     ---    110-129   >/= 130     ----    20-24 Y     0 - 119     ---    120-159   >/= 160     ----      >24 Y     0 -  99   100-129  130-159   160-189     >/=190      VLDL 01/18/2024 31  0 - 40 mg/dL Final    Triglycerides 01/18/2024 155 (H)  0 - 149 mg/dL Final       Age         Desirable   Borderline High   High     Very High   0 D-90 D    19 - 174         ----         ----        ----  91 D- 9 Y     0 -  74        75 -  99     >/= 100      ----    10-19 Y     0 -  89        90 - 129     >/= 130      ----    20-24 Y     0 - 114       115 - 149     >/= 150      ----         >24 Y     0 - 149       150 - 199    200- 499    >/= 500    Venipuncture immediately after or during the administration of Metamizole may lead to falsely low results. Testing should be performed immediately prior to Metamizole dosing.    Non HDL Cholesterol 01/18/2024 138  0 - 149 mg/dL Final          Age       Desirable   Borderline High   High     Very High     0-19 Y     0 - 119       120 - 144     >/= 145    >/= 160    20-24 Y     0 - 149       150 - 189     >/= 190      ----         >24 Y    30 mg/dL above LDL Cholesterol goal        Physical Exam  Constitutional:       General: He is not in acute distress.  Eyes:      Extraocular Movements: Extraocular movements intact.   Cardiovascular:      Rate and Rhythm: Normal rate and regular rhythm.   Pulmonary:      Breath sounds: Normal breath sounds.   Abdominal:      General: Bowel sounds are normal.   Musculoskeletal:         General: Normal range of motion.      Cervical back: No rigidity.    Skin:     Findings: No rash.   Neurological:      General: No focal deficit present.      Mental Status: He is alert.   Psychiatric:         Thought Content: Thought content normal.         Assessment/Plan   Problem List Items Addressed This Visit       Recurrent major depressive disorder, in partial remission (CMS-HCC) - Primary     Patient was given samples of Vraylar to add to lexapro 20 mg daily . Call back in 2-3 weeks          Relevant Medications    escitalopram (Lexapro) 20 mg tablet    Primary hypertension    Needs flu shot    Relevant Orders    Flu vaccine, trivalent, preservative free, age 6 months and greater (Fluraix/Fluzone/Flulaval) (Completed)     Hypertension we will c/w  amlodipine benazepril to 5/20 mg  Increase doses of Lexapro and BuSpar  Neck pain improved does not need an MRI  Sleep apnea  CPAP has been complaint CPAP very helpful. Using 8 hours per night .   Follow-up in 2 months           ROS otherwise negative aside from what was mentioned above in HPI.      Patient Active Problem List   Diagnosis    Recurrent major depressive disorder, in partial remission (CMS-HCC)    Anxiety, generalized    Obstructive sleep apnea    Neck pain    Primary hypertension    High risk for colon cancer    Vitamin a deficiency, unspecified    Healthcare maintenance    Gastroesophageal reflux disease without esophagitis    Need for immunization against influenza    Colon cancer screening    Routine general medical examination at a health care facility    Needs flu shot     Past Medical History:   Diagnosis Date    Anxiety     Anxiety state 04/10/2023    Depression     Episodic cluster headache 04/10/2023    Hypertension     Recurrent major depressive episodes (CMS-HCC) 04/10/2023    Sleep apnea     Tinea pedis of both feet 06/17/2019    Tobacco dependence syndrome 04/10/2023     Past Surgical History:   Procedure Laterality Date    CYST REMOVAL      VASECTOMY       Family History   Problem Relation Name Age of  Onset    Hypertension Mother      Heart disease Father      Hypertension Father      Colon cancer Other aunt     Pancreatic cancer Other uncle      Social History     Tobacco Use    Smoking status: Every Day     Current packs/day: 1.00     Average packs/day: 1 pack/day for 27.0 years (27.0 ttl pk-yrs)     Types: Cigarettes    Smokeless tobacco: Never   Substance Use Topics    Alcohol use: Yes    Drug use: Never         ALLERGIES  Allergies   Allergen Reactions    Bupropion Hcl Unknown         MEDICATIONS  Current Outpatient Medications   Medication Sig Dispense Refill    amLODIPine-benazepriL (Lotrel) 5-40 mg capsule TAKE 1 CAPSULE BY MOUTH EVERY DAY 90 capsule 0    blood pressure test kit-large kit Check BP daily 1 each 0    busPIRone (Buspar) 5 mg tablet Take 1 tablet (5 mg) by mouth 2 times a day. 180 tablet 1    escitalopram (Lexapro) 20 mg tablet Take 1 tablet (20 mg) by mouth once daily. 90 tablet 1    pantoprazole (ProtoNix) 40 mg EC tablet Take 1 tablet (40 mg) by mouth once daily. 90 tablet 1     No current facility-administered medications for this visit.         PHYSICAL EXAM  Visit Vitals  BP (!) 148/110   Pulse 103     .FLOWAMB[11   .FLOWAMB[14   Body mass index is 29.98 kg/m².  Gen: Alert, NAD  HEENT:  PERRLA, EOMI, conjunctiva and sclera normal in appearance  Respiratory:  Lungs CTAB  Cardiovascular:  Heart RRR. No M/R/G  Neuro:  Gross motor and sensory intact  Skin:  No suspicious lesions present    ASSESSMENT/PLAN  Problem List Items Addressed This Visit       Recurrent major depressive disorder, in partial remission (CMS-HCC) - Primary    Current Assessment & Plan     Patient was given samples of Vraylar to add to lexapro 20 mg daily . Call back in 2-3 weeks          Relevant Medications    escitalopram (Lexapro) 20 mg tablet    Primary hypertension    Needs flu shot    Relevant Orders    Flu vaccine, trivalent, preservative free, age 6 months and greater (Fluraix/Fluzone/Flulaval) (Completed)            Rodriguez Minor MD

## 2025-01-11 PROBLEM — Z23 NEEDS FLU SHOT: Status: ACTIVE | Noted: 2025-01-11

## 2025-01-11 ASSESSMENT — ENCOUNTER SYMPTOMS
ALLERGIC/IMMUNOLOGIC NEGATIVE: 1
VOMITING: 0
FEVER: 0
CHILLS: 0
NEUROLOGICAL NEGATIVE: 1
HEMATOLOGIC/LYMPHATIC NEGATIVE: 1
NAUSEA: 0
RESPIRATORY NEGATIVE: 1
MUSCULOSKELETAL NEGATIVE: 1
CARDIOVASCULAR NEGATIVE: 1
PSYCHIATRIC NEGATIVE: 1
GASTROINTESTINAL NEGATIVE: 1
ENDOCRINE NEGATIVE: 1

## 2025-01-27 ENCOUNTER — APPOINTMENT (OUTPATIENT)
Dept: PRIMARY CARE | Facility: CLINIC | Age: 45
End: 2025-01-27
Payer: COMMERCIAL

## 2025-02-01 DIAGNOSIS — F41.1 ANXIETY, GENERALIZED: ICD-10-CM

## 2025-02-02 RX ORDER — BUSPIRONE HYDROCHLORIDE 5 MG/1
5 TABLET ORAL 2 TIMES DAILY
Qty: 180 TABLET | Refills: 1 | Status: SHIPPED | OUTPATIENT
Start: 2025-02-02

## 2025-03-18 DIAGNOSIS — I10 PRIMARY HYPERTENSION: ICD-10-CM

## 2025-03-18 RX ORDER — AMLODIPINE AND BENAZEPRIL HYDROCHLORIDE 5; 40 MG/1; MG/1
1 CAPSULE ORAL DAILY
Qty: 90 CAPSULE | Refills: 0 | Status: SHIPPED | OUTPATIENT
Start: 2025-03-18

## 2025-04-02 DIAGNOSIS — K21.9 GASTROESOPHAGEAL REFLUX DISEASE WITHOUT ESOPHAGITIS: ICD-10-CM

## 2025-04-02 RX ORDER — PANTOPRAZOLE SODIUM 40 MG/1
40 TABLET, DELAYED RELEASE ORAL DAILY
Qty: 90 TABLET | Refills: 1 | Status: SHIPPED | OUTPATIENT
Start: 2025-04-02

## 2025-06-14 ENCOUNTER — APPOINTMENT (OUTPATIENT)
Dept: CARDIOLOGY | Facility: HOSPITAL | Age: 45
End: 2025-06-14
Payer: COMMERCIAL

## 2025-06-14 ENCOUNTER — HOSPITAL ENCOUNTER (EMERGENCY)
Facility: HOSPITAL | Age: 45
Discharge: HOME | End: 2025-06-14
Attending: EMERGENCY MEDICINE
Payer: COMMERCIAL

## 2025-06-14 ENCOUNTER — APPOINTMENT (OUTPATIENT)
Dept: RADIOLOGY | Facility: HOSPITAL | Age: 45
End: 2025-06-14
Payer: COMMERCIAL

## 2025-06-14 VITALS
HEIGHT: 69 IN | SYSTOLIC BLOOD PRESSURE: 131 MMHG | TEMPERATURE: 97.1 F | HEART RATE: 59 BPM | BODY MASS INDEX: 30.36 KG/M2 | DIASTOLIC BLOOD PRESSURE: 94 MMHG | RESPIRATION RATE: 17 BRPM | WEIGHT: 205 LBS | OXYGEN SATURATION: 95 %

## 2025-06-14 DIAGNOSIS — R07.9 CHEST PAIN, UNSPECIFIED TYPE: Primary | ICD-10-CM

## 2025-06-14 LAB
ALBUMIN SERPL BCP-MCNC: 4.4 G/DL (ref 3.4–5)
ALP SERPL-CCNC: 71 U/L (ref 33–120)
ALT SERPL W P-5'-P-CCNC: 15 U/L (ref 10–52)
ANION GAP SERPL CALC-SCNC: 15 MMOL/L (ref 10–20)
AST SERPL W P-5'-P-CCNC: 12 U/L (ref 9–39)
BASOPHILS # BLD AUTO: 0.08 X10*3/UL (ref 0–0.1)
BASOPHILS NFR BLD AUTO: 0.7 %
BILIRUB SERPL-MCNC: 0.6 MG/DL (ref 0–1.2)
BUN SERPL-MCNC: 13 MG/DL (ref 6–23)
CALCIUM SERPL-MCNC: 9.1 MG/DL (ref 8.6–10.3)
CARDIAC TROPONIN I PNL SERPL HS: 3 NG/L (ref 0–20)
CARDIAC TROPONIN I PNL SERPL HS: 3 NG/L (ref 0–20)
CHLORIDE SERPL-SCNC: 108 MMOL/L (ref 98–107)
CO2 SERPL-SCNC: 21 MMOL/L (ref 21–32)
CREAT SERPL-MCNC: 1.04 MG/DL (ref 0.5–1.3)
EGFRCR SERPLBLD CKD-EPI 2021: 90 ML/MIN/1.73M*2
EOSINOPHIL # BLD AUTO: 0.24 X10*3/UL (ref 0–0.7)
EOSINOPHIL NFR BLD AUTO: 2.1 %
ERYTHROCYTE [DISTWIDTH] IN BLOOD BY AUTOMATED COUNT: 14.1 % (ref 11.5–14.5)
GLUCOSE SERPL-MCNC: 130 MG/DL (ref 74–99)
HCT VFR BLD AUTO: 40.9 % (ref 41–52)
HGB BLD-MCNC: 13.8 G/DL (ref 13.5–17.5)
IMM GRANULOCYTES # BLD AUTO: 0.05 X10*3/UL (ref 0–0.7)
IMM GRANULOCYTES NFR BLD AUTO: 0.4 % (ref 0–0.9)
LYMPHOCYTES # BLD AUTO: 1.32 X10*3/UL (ref 1.2–4.8)
LYMPHOCYTES NFR BLD AUTO: 11.8 %
MAGNESIUM SERPL-MCNC: 2.22 MG/DL (ref 1.6–2.4)
MCH RBC QN AUTO: 30.5 PG (ref 26–34)
MCHC RBC AUTO-ENTMCNC: 33.7 G/DL (ref 32–36)
MCV RBC AUTO: 90 FL (ref 80–100)
MONOCYTES # BLD AUTO: 0.57 X10*3/UL (ref 0.1–1)
MONOCYTES NFR BLD AUTO: 5.1 %
NEUTROPHILS # BLD AUTO: 8.93 X10*3/UL (ref 1.2–7.7)
NEUTROPHILS NFR BLD AUTO: 79.9 %
NRBC BLD-RTO: 0 /100 WBCS (ref 0–0)
PLATELET # BLD AUTO: 297 X10*3/UL (ref 150–450)
POTASSIUM SERPL-SCNC: 3.8 MMOL/L (ref 3.5–5.3)
PROT SERPL-MCNC: 7 G/DL (ref 6.4–8.2)
RBC # BLD AUTO: 4.53 X10*6/UL (ref 4.5–5.9)
SODIUM SERPL-SCNC: 140 MMOL/L (ref 136–145)
WBC # BLD AUTO: 11.2 X10*3/UL (ref 4.4–11.3)

## 2025-06-14 PROCEDURE — 99285 EMERGENCY DEPT VISIT HI MDM: CPT | Mod: 25 | Performed by: EMERGENCY MEDICINE

## 2025-06-14 PROCEDURE — 2500000005 HC RX 250 GENERAL PHARMACY W/O HCPCS

## 2025-06-14 PROCEDURE — 36415 COLL VENOUS BLD VENIPUNCTURE: CPT

## 2025-06-14 PROCEDURE — 80053 COMPREHEN METABOLIC PANEL: CPT

## 2025-06-14 PROCEDURE — 71046 X-RAY EXAM CHEST 2 VIEWS: CPT | Performed by: RADIOLOGY

## 2025-06-14 PROCEDURE — 84484 ASSAY OF TROPONIN QUANT: CPT

## 2025-06-14 PROCEDURE — 93005 ELECTROCARDIOGRAM TRACING: CPT

## 2025-06-14 PROCEDURE — 2500000001 HC RX 250 WO HCPCS SELF ADMINISTERED DRUGS (ALT 637 FOR MEDICARE OP)

## 2025-06-14 PROCEDURE — 85025 COMPLETE CBC W/AUTO DIFF WBC: CPT

## 2025-06-14 PROCEDURE — 71046 X-RAY EXAM CHEST 2 VIEWS: CPT

## 2025-06-14 PROCEDURE — 83735 ASSAY OF MAGNESIUM: CPT

## 2025-06-14 RX ORDER — LIDOCAINE HYDROCHLORIDE 20 MG/ML
15 SOLUTION OROPHARYNGEAL ONCE
Status: COMPLETED | OUTPATIENT
Start: 2025-06-14 | End: 2025-06-14

## 2025-06-14 RX ORDER — ALUMINUM HYDROXIDE, MAGNESIUM HYDROXIDE, AND SIMETHICONE 1200; 120; 1200 MG/30ML; MG/30ML; MG/30ML
30 SUSPENSION ORAL ONCE
Status: COMPLETED | OUTPATIENT
Start: 2025-06-14 | End: 2025-06-14

## 2025-06-14 RX ADMIN — LIDOCAINE HYDROCHLORIDE 15 ML: 20 SOLUTION ORAL at 10:23

## 2025-06-14 RX ADMIN — ALUMINUM HYDROXIDE, MAGNESIUM HYDROXIDE, AND DIMETHICONE 30 ML: 200; 20; 200 SUSPENSION ORAL at 10:23

## 2025-06-14 ASSESSMENT — PAIN SCALES - GENERAL
PAINLEVEL_OUTOF10: 6
PAINLEVEL_OUTOF10: 7

## 2025-06-14 ASSESSMENT — PAIN - FUNCTIONAL ASSESSMENT: PAIN_FUNCTIONAL_ASSESSMENT: 0-10

## 2025-06-14 NOTE — ED PROVIDER NOTES
History of Present Illness   History provided by: Patient  Limitations to History: None  External Records Reviewed with Brief Summary: None    HPI:  Francisco J Walsh is a 44 y.o. male with a past medical history of hypertension, AMARI, GERD, and anxiety/depression that presents to the emergency department today for chest pain.  The patient states that he woke this morning with mild chest pain which is located in the left sternal region and did radiate through to his back into the left shoulder blade that progressively worsened throughout the morning.  His pain did increase to a 9/10 on the pain scale and he got dizzy, diaphoretic and nauseous at that time and had to sit down.  He denied any significant worsening when he went to go up the stairs earlier today.  He describes the pain as a squeezing-like sensation but currently is a dull ache.  He does have a history of GERD and initially thought this was what was going on and took a Tums this morning with no significant relief.  He called EMS and they did provide him with 324 mg of aspirin and gave him 1 nitroglycerin tablet and currently his pain is a 4/10 on the pain scale.  He does state that his dad had heart attack early on in life but denies any other significant family history.    Physical Exam   Triage vitals:  T 36.2 °C (97.1 °F)  HR 57  BP (!) 151/101  RR 18  O2 97 % None (Room air)    Vital signs reviewed in nursing triage note, EMR flow sheets, and at patient's bedside.   General: Awake, alert, in no acute distress  Eyes: No scleral icterus or injection  HENT: Normo-cephalic, atraumatic. No stridor. No rhinorrhea or epistaxis.  CV: Regular rate and rhythm. No murmurs appreciated.  2+ radial and DP pulses bilaterally.  Respiratory: Breathing non-labored, speaking in full sentences.  Lungs clear to auscultation bilaterally.  GI: Soft, non-distended, non-tender. No rebound or guarding.  MSK/Extremities: No gross bony deformities. Moving all extremities. No  lower extremity edema.  Skin: Warm. Appropriate color  Neuro: Alert. Oriented. Face symmetric. Speech is fluent.  Gross strength and sensation intact in b/l UE and LEs  Psych: Appropriate mood and affect      Medical Decision Making & ED Course   Medical Decision Makin y.o. male with the above-stated past medical history that presents to the emergency department for chest pain.  Upon arrival to the emergency department the patient was mildly hypertensive with blood pressure 151/101 but had otherwise stable vital signs, was afebrile and saturating well on room air.  History and physical exam are as above but notable for nontoxic-appearing patient in no acute distress.  His physical exam is grossly unremarkable and he has 2+ radial and DP pulses bilaterally.  He does not describe symptoms consistent with aortic dissection and will not require dedicated imaging for this.  He does not have any previous history of PE/DVT and is PERC negative and therefore will not require CT angiogram of the chest.  His symptoms are concerning for possible ACS and appropriate workup has been ordered including labs, EKG and chest x-ray.  Initial EKG did not show any evidence of ischemia.  The patient was loaded with aspirin and given 1 nitroglycerin tablet per EMS prior to arrival.  See ED course below for further workup and final disposition.    ED Course:  ED Course as of 25 1233   Sat 2025   1037 XR chest 2 views  Chest x-ray personally interpreted by me showing no focal infiltrate, cardiomegaly, pleural effusions, significant pulmonary edema or other concerning cardiopulmonary findings.  Otherwise normal chest x-ray. [RS]   1155 Labs reviewed which show no concerning electrolyte derangements, leukocytosis or anemia.  Initial troponin is 3, awaiting repeat troponin at this time. [RS]   1201 Troponin I Series, High Sensitivity (0, 1 HR)  Repeat troponin within normal limits. [RS]   1233 Patient reevaluated and feels  significantly improved after medications and feels comfortable with discharge home.  Patient's heart score is 3 and will not require inpatient cardiac workup but did encourage following up with his PCP for outpatient management.  Will provide him with a prescription for Maalox which has been sent to his pharmacy.  He was provided with strict return precautions and verbalizes understanding of this and he was discharged home in stable condition. [RS]      ED Course User Index  [RS] Eliezer Mcdonald DO         Diagnoses as of 06/14/25 1233   Chest pain, unspecified type        Social Determinants of Health which Significantly Impact Care: None identified     EKG Independent Interpretation: EKG personally interpreted by me showing sinus bradycardia at a rate of 59 bpm with normal axis.  Intervals are within normal limits.  There is no ST elevation or depression appreciated.  No concerning T wave abnormalities.  No KAMALA.  No prior EKGs available for comparison.    Independent Result Review and Interpretation: Relevant laboratory and radiographic results were reviewed and independently interpreted by myself.  As necessary, they are commented on in the ED Course.    Chronic conditions affecting the patient's care: As documented in the HPI    The patient was discussed with the following consultants/services: None    Care Considerations: As documented in the MDM    Disposition   As a result of the work-up, the patient was discharged home.  he was informed of his diagnosis and instructed to come back with any concerns or worsening of condition.  he and was agreeable to the plan as discussed above.  he was given the opportunity to ask questions.  All of the patient's questions were answered.    Procedures   Procedures    Patient seen and discussed with the ED attending physician.    Eliezer Mcdonald DO   Emergency Medicine, PGY-2     Disclaimer: This note was dictated by speech recognition. Minor errors in transcription may be  present.     [unfilled] ? [employer] last updated 6/14/2025 10:39 AM        Eliezer Mcdonald, DO  Resident  06/14/25 1236

## 2025-06-14 NOTE — ED TRIAGE NOTES
Pt brought in from home for 9/10 chest pain. Pt stated he also felt hot, nauseous, with cold sweats. Pt was given aspirin and nitroglycerin en route. Pt said pain lowered to about a 6/10. Pt was vomiting upon arrival to ED. Pt is A&Ox4

## 2025-06-14 NOTE — DISCHARGE INSTRUCTIONS
You were seen in the emergency department today for chest pain.  Your symptoms are likely related to reflux disease and a prescription for Maalox has been sent to your pharmacy and you can use this over the next few days for treatment of this.  It is important that you take this at least 45 minutes after you take your home medications as they will not absorb if you take this prior to then.  Please return to the emergency department if you begin having severe crushing chest pain that radiates to your back or arms, shortness of breath on exertion, break out in a sweats, have palpitations, passout or any other concerning symptoms.  You should follow-up with your primary care provider within the next 5 days for reevaluation of your symptoms and further discussion on outpatient cardiology follow-up.

## 2025-06-16 LAB
ATRIAL RATE: 50 BPM
ATRIAL RATE: 59 BPM
P AXIS: 31 DEGREES
P AXIS: 36 DEGREES
P OFFSET: 191 MS
P OFFSET: 192 MS
P ONSET: 140 MS
P ONSET: 140 MS
PR INTERVAL: 150 MS
PR INTERVAL: 152 MS
Q ONSET: 215 MS
Q ONSET: 216 MS
QRS COUNT: 10 BEATS
QRS COUNT: 9 BEATS
QRS DURATION: 80 MS
QRS DURATION: 84 MS
QT INTERVAL: 414 MS
QT INTERVAL: 438 MS
QTC CALCULATION(BAZETT): 377 MS
QTC CALCULATION(BAZETT): 433 MS
QTC FREDERICIA: 389 MS
QTC FREDERICIA: 435 MS
R AXIS: -10 DEGREES
R AXIS: -5 DEGREES
T AXIS: 28 DEGREES
T AXIS: 30 DEGREES
T OFFSET: 423 MS
T OFFSET: 434 MS
VENTRICULAR RATE: 50 BPM
VENTRICULAR RATE: 59 BPM

## 2025-06-17 DIAGNOSIS — I10 PRIMARY HYPERTENSION: ICD-10-CM

## 2025-06-18 ENCOUNTER — OFFICE VISIT (OUTPATIENT)
Dept: PRIMARY CARE | Facility: CLINIC | Age: 45
End: 2025-06-18
Payer: COMMERCIAL

## 2025-06-18 VITALS
BODY MASS INDEX: 32.73 KG/M2 | HEART RATE: 92 BPM | SYSTOLIC BLOOD PRESSURE: 177 MMHG | WEIGHT: 221 LBS | HEIGHT: 69 IN | DIASTOLIC BLOOD PRESSURE: 127 MMHG

## 2025-06-18 DIAGNOSIS — K21.9 GASTROESOPHAGEAL REFLUX DISEASE WITHOUT ESOPHAGITIS: Primary | ICD-10-CM

## 2025-06-18 DIAGNOSIS — G47.33 OBSTRUCTIVE SLEEP APNEA: ICD-10-CM

## 2025-06-18 DIAGNOSIS — F41.1 ANXIETY, GENERALIZED: ICD-10-CM

## 2025-06-18 DIAGNOSIS — F33.41 RECURRENT MAJOR DEPRESSIVE DISORDER, IN PARTIAL REMISSION: ICD-10-CM

## 2025-06-18 DIAGNOSIS — E78.49 FAMILIAL HYPERLIPIDEMIA: ICD-10-CM

## 2025-06-18 DIAGNOSIS — I10 PRIMARY HYPERTENSION: ICD-10-CM

## 2025-06-18 PROCEDURE — 3080F DIAST BP >= 90 MM HG: CPT | Performed by: FAMILY MEDICINE

## 2025-06-18 PROCEDURE — 3077F SYST BP >= 140 MM HG: CPT | Performed by: FAMILY MEDICINE

## 2025-06-18 PROCEDURE — 3008F BODY MASS INDEX DOCD: CPT | Performed by: FAMILY MEDICINE

## 2025-06-18 PROCEDURE — 99214 OFFICE O/P EST MOD 30 MIN: CPT | Performed by: FAMILY MEDICINE

## 2025-06-18 RX ORDER — AMLODIPINE AND BENAZEPRIL HYDROCHLORIDE 5; 40 MG/1; MG/1
1 CAPSULE ORAL DAILY
Qty: 90 CAPSULE | Refills: 1 | Status: SHIPPED | OUTPATIENT
Start: 2025-06-18

## 2025-06-18 RX ORDER — BUSPIRONE HYDROCHLORIDE 10 MG/1
10 TABLET ORAL 2 TIMES DAILY
Qty: 180 TABLET | Refills: 1 | Status: SHIPPED | OUTPATIENT
Start: 2025-06-18 | End: 2025-12-15

## 2025-06-18 RX ORDER — AMLODIPINE AND BENAZEPRIL HYDROCHLORIDE 5; 40 MG/1; MG/1
1 CAPSULE ORAL DAILY
Qty: 30 CAPSULE | Refills: 0 | OUTPATIENT
Start: 2025-06-18

## 2025-06-18 ASSESSMENT — PATIENT HEALTH QUESTIONNAIRE - PHQ9
SUM OF ALL RESPONSES TO PHQ9 QUESTIONS 1 AND 2: 0
2. FEELING DOWN, DEPRESSED OR HOPELESS: NOT AT ALL
1. LITTLE INTEREST OR PLEASURE IN DOING THINGS: NOT AT ALL

## 2025-06-18 ASSESSMENT — ENCOUNTER SYMPTOMS
OCCASIONAL FEELINGS OF UNSTEADINESS: 0
DEPRESSION: 0
LOSS OF SENSATION IN FEET: 0

## 2025-06-26 PROBLEM — E78.49 FAMILIAL HYPERLIPIDEMIA: Status: ACTIVE | Noted: 2025-06-26

## 2025-06-26 ASSESSMENT — ENCOUNTER SYMPTOMS
FEVER: 0
ALLERGIC/IMMUNOLOGIC NEGATIVE: 1
ENDOCRINE NEGATIVE: 1
CHILLS: 0
NERVOUS/ANXIOUS: 1
MUSCULOSKELETAL NEGATIVE: 1
CHEST TIGHTNESS: 1
NEUROLOGICAL NEGATIVE: 1
VOMITING: 0
HEMATOLOGIC/LYMPHATIC NEGATIVE: 1
CARDIOVASCULAR NEGATIVE: 1
NAUSEA: 1

## 2025-06-26 NOTE — PROGRESS NOTES
"Subjective   Patient ID: Francisco J Walsh is a 44 y.o. male who presents for ER Follow-up.      ER Follow-up  Associated symptoms include nausea. Pertinent negatives include no chills, fever, rash or vomiting.     Medications Ordered Prior to Encounter[1]   Francisco J Walsh is a 44 y.o. male with a past medical history of hypertension, AMARI, GERD, and anxiety/depression that presented to the emergency departmenfor chest pain.  The patient states that he wok with mild chest pain which is located in the left sternal region and did radiate through to his back into the left shoulder blade that progressively worsened throughout the morning.  His pain did increase to a 9/10 on the pain scale and he got dizzy, diaphoretic and nauseous at that time and had to sit down.  Since the ER visit his pain has resolved he states it was very bad later on in the night due to her altered level can her go to the ER after that he had a large bowel movement the pain started getting better he has been taking his pantoprazole regularly   Anxiety worse at times blood pressure is controlled  Review of Systems   Constitutional:  Negative for chills and fever.   HENT: Negative.     Respiratory:  Positive for chest tightness.    Cardiovascular: Negative.    Gastrointestinal:  Positive for nausea. Negative for vomiting.   Endocrine: Negative.    Genitourinary: Negative.    Musculoskeletal: Negative.    Skin: Negative.  Negative for rash.   Allergic/Immunologic: Negative.    Neurological: Negative.    Hematological: Negative.    Psychiatric/Behavioral:  The patient is nervous/anxious.    All other systems reviewed and are negative.      Objective   BP (!) 177/127   Pulse 92   Ht 1.753 m (5' 9\")   Wt 100 kg (221 lb)   BMI 32.64 kg/m²   BSA: 2.21 meters squared  Growth percentiles: Facility age limit for growth %chandu is 20 years. Facility age limit for growth %chandu is 20 years.   Admission on 06/14/2025, Discharged on 06/14/2025   Component Date Value " Ref Range Status    WBC 06/14/2025 11.2  4.4 - 11.3 x10*3/uL Final    nRBC 06/14/2025 0.0  0.0 - 0.0 /100 WBCs Final    RBC 06/14/2025 4.53  4.50 - 5.90 x10*6/uL Final    Hemoglobin 06/14/2025 13.8  13.5 - 17.5 g/dL Final    Hematocrit 06/14/2025 40.9 (L)  41.0 - 52.0 % Final    MCV 06/14/2025 90  80 - 100 fL Final    MCH 06/14/2025 30.5  26.0 - 34.0 pg Final    MCHC 06/14/2025 33.7  32.0 - 36.0 g/dL Final    RDW 06/14/2025 14.1  11.5 - 14.5 % Final    Platelets 06/14/2025 297  150 - 450 x10*3/uL Final    Neutrophils % 06/14/2025 79.9  40.0 - 80.0 % Final    Immature Granulocytes %, Automated 06/14/2025 0.4  0.0 - 0.9 % Final    Immature Granulocyte Count (IG) includes promyelocytes, myelocytes and metamyelocytes but does not include bands. Percent differential counts (%) should be interpreted in the context of the absolute cell counts (cells/UL).    Lymphocytes % 06/14/2025 11.8  13.0 - 44.0 % Final    Monocytes % 06/14/2025 5.1  2.0 - 10.0 % Final    Eosinophils % 06/14/2025 2.1  0.0 - 6.0 % Final    Basophils % 06/14/2025 0.7  0.0 - 2.0 % Final    Neutrophils Absolute 06/14/2025 8.93 (H)  1.20 - 7.70 x10*3/uL Final    Percent differential counts (%) should be interpreted in the context of the absolute cell counts (cells/uL).    Immature Granulocytes Absolute, Au* 06/14/2025 0.05  0.00 - 0.70 x10*3/uL Final    Lymphocytes Absolute 06/14/2025 1.32  1.20 - 4.80 x10*3/uL Final    Monocytes Absolute 06/14/2025 0.57  0.10 - 1.00 x10*3/uL Final    Eosinophils Absolute 06/14/2025 0.24  0.00 - 0.70 x10*3/uL Final    Basophils Absolute 06/14/2025 0.08  0.00 - 0.10 x10*3/uL Final    Glucose 06/14/2025 130 (H)  74 - 99 mg/dL Final    Sodium 06/14/2025 140  136 - 145 mmol/L Final    Potassium 06/14/2025 3.8  3.5 - 5.3 mmol/L Final    Chloride 06/14/2025 108 (H)  98 - 107 mmol/L Final    Bicarbonate 06/14/2025 21  21 - 32 mmol/L Final    Anion Gap 06/14/2025 15  10 - 20 mmol/L Final    Urea Nitrogen 06/14/2025 13  6 - 23 mg/dL  Final    Creatinine 06/14/2025 1.04  0.50 - 1.30 mg/dL Final    eGFR 06/14/2025 90  >60 mL/min/1.73m*2 Final    Calculations of estimated GFR are performed using the 2021 CKD-EPI Study Refit equation without the race variable for the IDMS-Traceable creatinine methods.  https://jasn.asnjournals.org/content/early/2021/09/22/ASN.4151507270    Calcium 06/14/2025 9.1  8.6 - 10.3 mg/dL Final    Albumin 06/14/2025 4.4  3.4 - 5.0 g/dL Final    Alkaline Phosphatase 06/14/2025 71  33 - 120 U/L Final    Total Protein 06/14/2025 7.0  6.4 - 8.2 g/dL Final    AST 06/14/2025 12  9 - 39 U/L Final    Bilirubin, Total 06/14/2025 0.6  0.0 - 1.2 mg/dL Final    ALT 06/14/2025 15  10 - 52 U/L Final    Patients treated with Sulfasalazine may generate falsely decreased results for ALT.    Magnesium 06/14/2025 2.22  1.60 - 2.40 mg/dL Final    Ventricular Rate 06/14/2025 50  BPM Final    Atrial Rate 06/14/2025 50  BPM Final    NV Interval 06/14/2025 152  ms Final    QRS Duration 06/14/2025 80  ms Final    QT Interval 06/14/2025 414  ms Final    QTC Calculation(Bazett) 06/14/2025 377  ms Final    P Axis 06/14/2025 36  degrees Final    R Axis 06/14/2025 -10  degrees Final    T Axis 06/14/2025 30  degrees Final    QRS Count 06/14/2025 9  beats Final    Q Onset 06/14/2025 216  ms Final    P Onset 06/14/2025 140  ms Final    P Offset 06/14/2025 192  ms Final    T Offset 06/14/2025 423  ms Final    QTC Fredericia 06/14/2025 389  ms Final    Ventricular Rate 06/14/2025 59  BPM Final    Atrial Rate 06/14/2025 59  BPM Final    NV Interval 06/14/2025 150  ms Final    QRS Duration 06/14/2025 84  ms Final    QT Interval 06/14/2025 438  ms Final    QTC Calculation(Bazett) 06/14/2025 433  ms Final    P Axis 06/14/2025 31  degrees Final    R Axis 06/14/2025 -5  degrees Final    T Axis 06/14/2025 28  degrees Final    QRS Count 06/14/2025 10  beats Final    Q Onset 06/14/2025 215  ms Final    P Onset 06/14/2025 140  ms Final    P Offset 06/14/2025 191  ms  Final    T Offset 2025 434  ms Final    QTC Fredericia 2025 435  ms Final    Troponin I, High Sensitivity 2025 3  0 - 20 ng/L Final    Troponin I, High Sensitivity 2025 3  0 - 20 ng/L Final      Physical Exam  Constitutional:       Appearance: Normal appearance.   Cardiovascular:      Rate and Rhythm: Normal rate and regular rhythm.   Pulmonary:      Effort: Pulmonary effort is normal.      Breath sounds: Normal breath sounds.   Abdominal:      General: Bowel sounds are normal.   Neurological:      General: No focal deficit present.      Mental Status: He is alert.   Psychiatric:         Mood and Affect: Mood normal.         Assessment/Plan   Problem List Items Addressed This Visit       Recurrent major depressive disorder, in partial remission    Patient was given samples of Vraylar to add to lexapro 20 mg daily . Call back in 2-3 weeks          Anxiety, generalized    Patient will message back in few weeks regarding fatigue if it is improved with=decreasing dose of Lexapro may need to change medication         Obstructive sleep apnea    Has been compliant with CPAP using it 8 hours a night and feels much better it is medically necessary for patient to have CPAP due to severe sleep apnea         Primary hypertension    Relevant Medications    amLODIPine-benazepriL (Lotrel) 5-40 mg capsule    Other Relevant Orders    Lipid Panel    TSH with reflex to Free T4 if abnormal    Gastroesophageal reflux disease without esophagitis - Primary    Relevant Medications    busPIRone (Buspar) 10 mg tablet    Other Relevant Orders    Lipid Panel    TSH with reflex to Free T4 if abnormal    Familial hyperlipidemia    Relevant Orders    Lipid Panel    TSH with reflex to Free T4 if abnormal                   [1]   Current Outpatient Medications on File Prior to Visit   Medication Sig Dispense Refill    [] aluminum-magnesium hydroxide 200-200 mg/5 mL suspension Take 10 mL by mouth every 6 hours if needed  for heartburn for up to 10 days. 355 mL 0    blood pressure test kit-large kit Check BP daily 1 each 0    escitalopram (Lexapro) 20 mg tablet Take 1 tablet (20 mg) by mouth once daily. 90 tablet 1    pantoprazole (ProtoNix) 40 mg EC tablet TAKE 1 TABLET BY MOUTH EVERY DAY 90 tablet 1     No current facility-administered medications on file prior to visit.

## 2025-07-07 DIAGNOSIS — F33.41 RECURRENT MAJOR DEPRESSIVE DISORDER, IN PARTIAL REMISSION: ICD-10-CM

## 2025-07-07 RX ORDER — ESCITALOPRAM OXALATE 20 MG/1
20 TABLET ORAL DAILY
Qty: 90 TABLET | Refills: 1 | Status: SHIPPED | OUTPATIENT
Start: 2025-07-07

## 2025-12-18 ENCOUNTER — APPOINTMENT (OUTPATIENT)
Dept: PRIMARY CARE | Facility: CLINIC | Age: 45
End: 2025-12-18
Payer: COMMERCIAL